# Patient Record
Sex: MALE | Race: WHITE | NOT HISPANIC OR LATINO | Employment: OTHER | URBAN - NONMETROPOLITAN AREA
[De-identification: names, ages, dates, MRNs, and addresses within clinical notes are randomized per-mention and may not be internally consistent; named-entity substitution may affect disease eponyms.]

---

## 2009-11-07 LAB — HCV AB SER-ACNC: NEGATIVE

## 2020-11-12 ENCOUNTER — HOSPITAL ENCOUNTER (EMERGENCY)
Facility: HOSPITAL | Age: 70
Discharge: HOME | End: 2020-11-12
Attending: EMERGENCY MEDICINE | Admitting: EMERGENCY MEDICINE
Payer: MEDICARE

## 2020-11-12 VITALS
BODY MASS INDEX: 46.43 KG/M2 | DIASTOLIC BLOOD PRESSURE: 80 MMHG | SYSTOLIC BLOOD PRESSURE: 132 MMHG | HEART RATE: 60 BPM | HEIGHT: 69 IN | TEMPERATURE: 97.3 F | OXYGEN SATURATION: 97 % | RESPIRATION RATE: 16 BRPM | WEIGHT: 313.49 LBS
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
## 2020-11-12 DIAGNOSIS — L03.313 CELLULITIS OF CHEST WALL: ICD-10-CM

## 2020-11-12 LAB
ALBUMIN SERPL BCP-MCNC: 3 G/DL (ref 3.5–5)
ALP SERPL-CCNC: 56 U/L (ref 46–116)
ALT SERPL W P-5'-P-CCNC: 26 U/L (ref 12–78)
ANION GAP SERPL CALCULATED.3IONS-SCNC: 5 MMOL/L (ref 4–13)
AST SERPL W P-5'-P-CCNC: 18 U/L (ref 5–45)
BASOPHILS # BLD AUTO: 0.08 THOUSANDS/ΜL (ref 0–0.1)
BASOPHILS NFR BLD AUTO: 1 % (ref 0–1)
BILIRUB SERPL-MCNC: 0.49 MG/DL (ref 0.2–1)
BUN SERPL-MCNC: 18 MG/DL (ref 5–25)
CALCIUM ALBUM COR SERPL-MCNC: 9.6 MG/DL (ref 8.3–10.1)
CALCIUM SERPL-MCNC: 8.8 MG/DL (ref 8.3–10.1)
CHLORIDE SERPL-SCNC: 102 MMOL/L (ref 100–108)
CO2 SERPL-SCNC: 31 MMOL/L (ref 21–32)
CREAT SERPL-MCNC: 1.17 MG/DL (ref 0.6–1.3)
EOSINOPHIL # BLD AUTO: 0.53 THOUSAND/ΜL (ref 0–0.61)
EOSINOPHIL NFR BLD AUTO: 6 % (ref 0–6)
ERYTHROCYTE [DISTWIDTH] IN BLOOD BY AUTOMATED COUNT: 13.7 % (ref 11.6–15.1)
GFR SERPL CREATININE-BSD FRML MDRD: 64 ML/MIN/1.73SQ M
GLUCOSE SERPL-MCNC: 125 MG/DL (ref 65–140)
HCT VFR BLD AUTO: 39.1 % (ref 36.5–49.3)
HGB BLD-MCNC: 12.8 G/DL (ref 12–17)
IMM GRANULOCYTES # BLD AUTO: 0.02 THOUSAND/UL (ref 0–0.2)
IMM GRANULOCYTES NFR BLD AUTO: 0 % (ref 0–2)
LYMPHOCYTES # BLD AUTO: 1.79 THOUSANDS/ΜL (ref 0.6–4.47)
LYMPHOCYTES NFR BLD AUTO: 22 % (ref 14–44)
MCH RBC QN AUTO: 29.1 PG (ref 26.8–34.3)
MCHC RBC AUTO-ENTMCNC: 32.7 G/DL (ref 31.4–37.4)
MCV RBC AUTO: 89 FL (ref 82–98)
MONOCYTES # BLD AUTO: 0.89 THOUSAND/ΜL (ref 0.17–1.22)
MONOCYTES NFR BLD AUTO: 11 % (ref 4–12)
NEUTROPHILS # BLD AUTO: 4.97 THOUSANDS/ΜL (ref 1.85–7.62)
NEUTS SEG NFR BLD AUTO: 60 % (ref 43–75)
NRBC BLD AUTO-RTO: 0 /100 WBCS
PLATELET # BLD AUTO: 350 THOUSANDS/UL (ref 149–390)
PMV BLD AUTO: 8.9 FL (ref 8.9–12.7)
POTASSIUM SERPL-SCNC: 3.8 MMOL/L (ref 3.5–5.3)
PROT SERPL-MCNC: 6.9 G/DL (ref 6.4–8.2)
RBC # BLD AUTO: 4.4 MILLION/UL (ref 3.88–5.62)
SODIUM SERPL-SCNC: 138 MMOL/L (ref 136–145)
WBC # BLD AUTO: 8.28 THOUSAND/UL (ref 4.31–10.16)

## 2020-11-12 PROCEDURE — 86618 LYME DISEASE ANTIBODY: CPT | Performed by: EMERGENCY MEDICINE

## 2020-11-12 PROCEDURE — 36415 COLL VENOUS BLD VENIPUNCTURE: CPT | Performed by: EMERGENCY MEDICINE

## 2020-11-12 PROCEDURE — 99283 EMERGENCY DEPT VISIT LOW MDM: CPT

## 2020-11-12 PROCEDURE — 80053 COMPREHEN METABOLIC PANEL: CPT | Performed by: EMERGENCY MEDICINE

## 2020-11-12 PROCEDURE — 85025 COMPLETE CBC W/AUTO DIFF WBC: CPT | Performed by: EMERGENCY MEDICINE

## 2020-11-12 PROCEDURE — 99284 EMERGENCY DEPT VISIT MOD MDM: CPT | Performed by: EMERGENCY MEDICINE

## 2020-11-12 RX ORDER — CHLORAL HYDRATE 500 MG
1 CAPSULE ORAL DAILY
COMMUNITY

## 2020-11-12 RX ORDER — METFORMIN HYDROCHLORIDE 500 MG/1
1500 TABLET, EXTENDED RELEASE ORAL DAILY
COMMUNITY
Start: 2020-07-15

## 2020-11-12 RX ORDER — HYDROCHLOROTHIAZIDE 50 MG/1
50 TABLET ORAL DAILY
COMMUNITY
Start: 2020-01-14 | End: 2021-01-13

## 2020-11-12 RX ORDER — CEPHALEXIN 250 MG/1
500 CAPSULE ORAL ONCE
Status: COMPLETED | OUTPATIENT
Start: 2020-11-12 | End: 2020-11-12

## 2020-11-12 RX ORDER — GLIMEPIRIDE 4 MG/1
4 TABLET ORAL DAILY
COMMUNITY
Start: 2020-07-15 | End: 2021-07-15

## 2020-11-12 RX ORDER — ENALAPRIL MALEATE 10 MG/1
10 TABLET ORAL DAILY
COMMUNITY
Start: 2020-01-14 | End: 2021-01-13

## 2020-11-12 RX ORDER — ASPIRIN 325 MG
325 TABLET ORAL DAILY
COMMUNITY

## 2020-11-12 RX ORDER — NITROGLYCERIN 0.4 MG/1
0.4 TABLET SUBLINGUAL AS NEEDED
COMMUNITY
Start: 2020-07-15

## 2020-11-12 RX ORDER — ATORVASTATIN CALCIUM 40 MG/1
40 TABLET, FILM COATED ORAL DAILY
COMMUNITY
Start: 2020-01-17 | End: 2021-01-16

## 2020-11-12 RX ORDER — GINSENG 100 MG
1 CAPSULE ORAL ONCE
Status: COMPLETED | OUTPATIENT
Start: 2020-11-12 | End: 2020-11-12

## 2020-11-12 RX ORDER — CEPHALEXIN 500 MG/1
500 CAPSULE ORAL EVERY 6 HOURS SCHEDULED
Qty: 20 CAPSULE | Refills: 0 | Status: SHIPPED | OUTPATIENT
Start: 2020-11-12 | End: 2020-11-17

## 2020-11-12 RX ADMIN — BACITRACIN ZINC 1 SMALL APPLICATION: 500 OINTMENT TOPICAL at 11:34

## 2020-11-12 RX ADMIN — CEPHALEXIN 500 MG: 250 CAPSULE ORAL at 11:34

## 2020-11-14 LAB — B BURGDOR IGG+IGM SER-ACNC: 35

## 2022-02-23 ENCOUNTER — HOSPITAL ENCOUNTER (EMERGENCY)
Facility: HOSPITAL | Age: 72
Discharge: HOME | End: 2022-02-23
Attending: EMERGENCY MEDICINE | Admitting: EMERGENCY MEDICINE
Payer: MEDICARE

## 2022-02-23 ENCOUNTER — APPOINTMENT (EMERGENCY)
Dept: RADIOLOGY | Facility: HOSPITAL | Age: 72
End: 2022-02-23
Payer: MEDICARE

## 2022-02-23 ENCOUNTER — APPOINTMENT (EMERGENCY)
Dept: CT IMAGING | Facility: HOSPITAL | Age: 72
End: 2022-02-23
Payer: MEDICARE

## 2022-02-23 VITALS
WEIGHT: 315 LBS | OXYGEN SATURATION: 95 % | BODY MASS INDEX: 48.74 KG/M2 | SYSTOLIC BLOOD PRESSURE: 121 MMHG | HEART RATE: 82 BPM | RESPIRATION RATE: 19 BRPM | TEMPERATURE: 97 F | DIASTOLIC BLOOD PRESSURE: 72 MMHG

## 2022-02-23 DIAGNOSIS — S60.011A CONTUSION OF RIGHT THUMB: ICD-10-CM

## 2022-02-23 DIAGNOSIS — S80.02XA CONTUSION OF LEFT KNEE, INITIAL ENCOUNTER: ICD-10-CM

## 2022-02-23 DIAGNOSIS — S16.1XXA STRAIN OF NECK MUSCLE, INITIAL ENCOUNTER: ICD-10-CM

## 2022-02-23 DIAGNOSIS — S00.83XA CONTUSION OF FACE, INITIAL ENCOUNTER: ICD-10-CM

## 2022-02-23 DIAGNOSIS — T07.XXXA ABRASIONS OF MULTIPLE SITES: ICD-10-CM
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
## 2022-02-23 PROCEDURE — 99284 EMERGENCY DEPT VISIT MOD MDM: CPT

## 2022-02-23 PROCEDURE — 99284 EMERGENCY DEPT VISIT MOD MDM: CPT | Performed by: EMERGENCY MEDICINE

## 2022-02-23 PROCEDURE — 73564 X-RAY EXAM KNEE 4 OR MORE: CPT

## 2022-02-23 PROCEDURE — 90471 IMMUNIZATION ADMIN: CPT

## 2022-02-23 PROCEDURE — 90715 TDAP VACCINE 7 YRS/> IM: CPT | Performed by: EMERGENCY MEDICINE

## 2022-02-23 PROCEDURE — 72125 CT NECK SPINE W/O DYE: CPT

## 2022-02-23 PROCEDURE — 70450 CT HEAD/BRAIN W/O DYE: CPT

## 2022-02-23 PROCEDURE — 73140 X-RAY EXAM OF FINGER(S): CPT

## 2022-02-23 RX ADMIN — TETANUS TOXOID, REDUCED DIPHTHERIA TOXOID AND ACELLULAR PERTUSSIS VACCINE, ADSORBED 0.5 ML: 5; 2.5; 8; 8; 2.5 SUSPENSION INTRAMUSCULAR at 18:27
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
## 2022-03-20 ENCOUNTER — HOSPITAL ENCOUNTER (EMERGENCY)
Facility: HOSPITAL | Age: 72
Discharge: HOME | End: 2022-03-20
Attending: EMERGENCY MEDICINE | Admitting: EMERGENCY MEDICINE
Payer: MEDICARE

## 2022-03-20 VITALS
SYSTOLIC BLOOD PRESSURE: 144 MMHG | HEART RATE: 87 BPM | WEIGHT: 315 LBS | TEMPERATURE: 97.9 F | OXYGEN SATURATION: 96 % | DIASTOLIC BLOOD PRESSURE: 106 MMHG | BODY MASS INDEX: 46.65 KG/M2 | HEIGHT: 69 IN | RESPIRATION RATE: 24 BRPM

## 2022-03-20 DIAGNOSIS — L03.116 CELLULITIS OF LEFT KNEE: Primary | ICD-10-CM

## 2022-03-20 LAB
ALBUMIN SERPL BCP-MCNC: 3.2 G/DL (ref 3.5–5)
ALP SERPL-CCNC: 65 U/L (ref 46–116)
ALT SERPL W P-5'-P-CCNC: 30 U/L (ref 12–78)
ANION GAP SERPL CALCULATED.3IONS-SCNC: 6 MMOL/L (ref 4–13)
AST SERPL W P-5'-P-CCNC: 18 U/L (ref 5–45)
BASOPHILS # BLD AUTO: 0.08 THOUSANDS/ΜL (ref 0–0.1)
BASOPHILS NFR BLD AUTO: 1 % (ref 0–1)
BILIRUB SERPL-MCNC: 0.49 MG/DL (ref 0.2–1)
BUN SERPL-MCNC: 15 MG/DL (ref 5–25)
CALCIUM ALBUM COR SERPL-MCNC: 9.9 MG/DL (ref 8.3–10.1)
CALCIUM SERPL-MCNC: 9.3 MG/DL (ref 8.3–10.1)
CHLORIDE SERPL-SCNC: 95 MMOL/L (ref 100–108)
CO2 SERPL-SCNC: 34 MMOL/L (ref 21–32)
CREAT SERPL-MCNC: 0.91 MG/DL (ref 0.6–1.3)
EOSINOPHIL # BLD AUTO: 0.2 THOUSAND/ΜL (ref 0–0.61)
EOSINOPHIL NFR BLD AUTO: 2 % (ref 0–6)
ERYTHROCYTE [DISTWIDTH] IN BLOOD BY AUTOMATED COUNT: 14 % (ref 11.6–15.1)
GFR SERPL CREATININE-BSD FRML MDRD: 85 ML/MIN/1.73SQ M
GLUCOSE SERPL-MCNC: 136 MG/DL (ref 65–140)
HCT VFR BLD AUTO: 40 % (ref 36.5–49.3)
HGB BLD-MCNC: 13.2 G/DL (ref 12–17)
IMM GRANULOCYTES # BLD AUTO: 0.05 THOUSAND/UL (ref 0–0.2)
IMM GRANULOCYTES NFR BLD AUTO: 0 % (ref 0–2)
LACTATE SERPL-SCNC: 1 MMOL/L (ref 0.5–2)
LYMPHOCYTES # BLD AUTO: 1.95 THOUSANDS/ΜL (ref 0.6–4.47)
LYMPHOCYTES NFR BLD AUTO: 16 % (ref 14–44)
MCH RBC QN AUTO: 29.2 PG (ref 26.8–34.3)
MCHC RBC AUTO-ENTMCNC: 33 G/DL (ref 31.4–37.4)
MCV RBC AUTO: 89 FL (ref 82–98)
MONOCYTES # BLD AUTO: 1.23 THOUSAND/ΜL (ref 0.17–1.22)
MONOCYTES NFR BLD AUTO: 10 % (ref 4–12)
NEUTROPHILS # BLD AUTO: 8.55 THOUSANDS/ΜL (ref 1.85–7.62)
NEUTS SEG NFR BLD AUTO: 71 % (ref 43–75)
NRBC BLD AUTO-RTO: 0 /100 WBCS
PLATELET # BLD AUTO: 372 THOUSANDS/UL (ref 149–390)
PMV BLD AUTO: 8.7 FL (ref 8.9–12.7)
POTASSIUM SERPL-SCNC: 3.7 MMOL/L (ref 3.5–5.3)
PROT SERPL-MCNC: 8.2 G/DL (ref 6.4–8.2)
RBC # BLD AUTO: 4.52 MILLION/UL (ref 3.88–5.62)
SODIUM SERPL-SCNC: 135 MMOL/L (ref 136–145)
WBC # BLD AUTO: 12.06 THOUSAND/UL (ref 4.31–10.16)

## 2022-03-20 PROCEDURE — 99283 EMERGENCY DEPT VISIT LOW MDM: CPT

## 2022-03-20 PROCEDURE — 99284 EMERGENCY DEPT VISIT MOD MDM: CPT | Performed by: EMERGENCY MEDICINE

## 2022-03-20 PROCEDURE — 96367 TX/PROPH/DG ADDL SEQ IV INF: CPT

## 2022-03-20 PROCEDURE — 83605 ASSAY OF LACTIC ACID: CPT | Performed by: EMERGENCY MEDICINE

## 2022-03-20 PROCEDURE — 96365 THER/PROPH/DIAG IV INF INIT: CPT

## 2022-03-20 PROCEDURE — 96366 THER/PROPH/DIAG IV INF ADDON: CPT

## 2022-03-20 PROCEDURE — 85025 COMPLETE CBC W/AUTO DIFF WBC: CPT | Performed by: EMERGENCY MEDICINE

## 2022-03-20 PROCEDURE — 80053 COMPREHEN METABOLIC PANEL: CPT | Performed by: EMERGENCY MEDICINE

## 2022-03-20 PROCEDURE — 87040 BLOOD CULTURE FOR BACTERIA: CPT | Performed by: EMERGENCY MEDICINE

## 2022-03-20 PROCEDURE — 36415 COLL VENOUS BLD VENIPUNCTURE: CPT | Performed by: EMERGENCY MEDICINE

## 2022-03-20 RX ORDER — AMOXICILLIN AND CLAVULANATE POTASSIUM 875; 125 MG/1; MG/1
1 TABLET, FILM COATED ORAL EVERY 12 HOURS
Qty: 20 TABLET | Refills: 0 | Status: SHIPPED | OUTPATIENT
Start: 2022-03-20 | End: 2022-03-30

## 2022-03-20 RX ORDER — CEFTRIAXONE 1 G/50ML
1000 INJECTION, SOLUTION INTRAVENOUS ONCE
Status: COMPLETED | OUTPATIENT
Start: 2022-03-20 | End: 2022-03-20

## 2022-03-20 RX ADMIN — Medication 1500 MG: at 20:07

## 2022-03-20 RX ADMIN — CEFTRIAXONE 1000 MG: 1 INJECTION, SOLUTION INTRAVENOUS at 19:48
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
## 2022-03-20 NOTE — DISCHARGE INSTRUCTIONS
Warm compresses to the area  Please return if symptoms worsen  Please return if he developed any fevers or chills

## 2022-03-26 LAB
BACTERIA BLD CULT: NORMAL
BACTERIA BLD CULT: NORMAL

## 2023-03-30 ENCOUNTER — APPOINTMENT (EMERGENCY)
Dept: RADIOLOGY | Facility: HOSPITAL | Age: 73
End: 2023-03-30

## 2023-03-30 ENCOUNTER — HOSPITAL ENCOUNTER (INPATIENT)
Facility: HOSPITAL | Age: 73
LOS: 3 days | Discharge: HOME HEALTH | End: 2023-04-02
Attending: EMERGENCY MEDICINE | Admitting: STUDENT IN AN ORGANIZED HEALTH CARE EDUCATION/TRAINING PROGRAM

## 2023-03-30 DIAGNOSIS — E66.01 CLASS 3 SEVERE OBESITY DUE TO EXCESS CALORIES WITH SERIOUS COMORBIDITY AND BODY MASS INDEX (BMI) OF 45.0 TO 49.9 IN ADULT (HCC): ICD-10-CM

## 2023-03-30 DIAGNOSIS — I50.33 ACUTE ON CHRONIC DIASTOLIC (CONGESTIVE) HEART FAILURE (HCC): ICD-10-CM

## 2023-03-30 DIAGNOSIS — I50.9 ACUTE CONGESTIVE HEART FAILURE, UNSPECIFIED HEART FAILURE TYPE (HCC): Primary | ICD-10-CM

## 2023-03-30 PROBLEM — I48.11 LONGSTANDING PERSISTENT ATRIAL FIBRILLATION (HCC): Status: ACTIVE | Noted: 2023-03-30

## 2023-03-30 PROBLEM — E66.813 CLASS 3 SEVERE OBESITY DUE TO EXCESS CALORIES WITH SERIOUS COMORBIDITY AND BODY MASS INDEX (BMI) OF 45.0 TO 49.9 IN ADULT (HCC): Status: ACTIVE | Noted: 2023-03-30

## 2023-03-30 PROBLEM — E11.65 TYPE 2 DIABETES MELLITUS WITH HYPERGLYCEMIA, WITHOUT LONG-TERM CURRENT USE OF INSULIN (HCC): Status: ACTIVE | Noted: 2023-03-30

## 2023-03-30 LAB
2HR DELTA HS TROPONIN: 0 NG/L
ALBUMIN SERPL BCP-MCNC: 3.9 G/DL (ref 3.5–5)
ALP SERPL-CCNC: 58 U/L (ref 34–104)
ALT SERPL W P-5'-P-CCNC: 20 U/L (ref 7–52)
ANION GAP SERPL CALCULATED.3IONS-SCNC: 6 MMOL/L (ref 4–13)
APTT PPP: 42 SECONDS (ref 23–37)
AST SERPL W P-5'-P-CCNC: 21 U/L (ref 13–39)
BASE EX.OXY STD BLDV CALC-SCNC: 54.1 % (ref 60–80)
BASE EXCESS BLDV CALC-SCNC: 5.6 MMOL/L
BASOPHILS # BLD AUTO: 0.12 THOUSANDS/ÂΜL (ref 0–0.1)
BASOPHILS NFR BLD AUTO: 1 % (ref 0–1)
BETA-HYDROXYBUTYRATE: 0.2 MMOL/L
BILIRUB SERPL-MCNC: 0.55 MG/DL (ref 0.2–1)
BNP SERPL-MCNC: 210 PG/ML (ref 0–100)
BUN SERPL-MCNC: 16 MG/DL (ref 5–25)
CALCIUM SERPL-MCNC: 9.6 MG/DL (ref 8.4–10.2)
CARDIAC TROPONIN I PNL SERPL HS: 7 NG/L
CARDIAC TROPONIN I PNL SERPL HS: 7 NG/L
CHLORIDE SERPL-SCNC: 101 MMOL/L (ref 96–108)
CO2 SERPL-SCNC: 32 MMOL/L (ref 21–32)
CREAT SERPL-MCNC: 0.91 MG/DL (ref 0.6–1.3)
EOSINOPHIL # BLD AUTO: 1.36 THOUSAND/ÂΜL (ref 0–0.61)
EOSINOPHIL NFR BLD AUTO: 14 % (ref 0–6)
ERYTHROCYTE [DISTWIDTH] IN BLOOD BY AUTOMATED COUNT: 13.9 % (ref 11.6–15.1)
GFR SERPL CREATININE-BSD FRML MDRD: 84 ML/MIN/1.73SQ M
GLUCOSE SERPL-MCNC: 141 MG/DL (ref 65–140)
GLUCOSE SERPL-MCNC: 145 MG/DL (ref 65–140)
HCO3 BLDV-SCNC: 32.2 MMOL/L (ref 24–30)
HCT VFR BLD AUTO: 41.6 % (ref 36.5–49.3)
HGB BLD-MCNC: 12.9 G/DL (ref 12–17)
IMM GRANULOCYTES # BLD AUTO: 0.03 THOUSAND/UL (ref 0–0.2)
IMM GRANULOCYTES NFR BLD AUTO: 0 % (ref 0–2)
INR PPP: 1.94 (ref 0.84–1.19)
LYMPHOCYTES # BLD AUTO: 2.18 THOUSANDS/ÂΜL (ref 0.6–4.47)
LYMPHOCYTES NFR BLD AUTO: 22 % (ref 14–44)
MCH RBC QN AUTO: 28.2 PG (ref 26.8–34.3)
MCHC RBC AUTO-ENTMCNC: 31 G/DL (ref 31.4–37.4)
MCV RBC AUTO: 91 FL (ref 82–98)
MONOCYTES # BLD AUTO: 0.98 THOUSAND/ÂΜL (ref 0.17–1.22)
MONOCYTES NFR BLD AUTO: 10 % (ref 4–12)
NEUTROPHILS # BLD AUTO: 5.4 THOUSANDS/ÂΜL (ref 1.85–7.62)
NEUTS SEG NFR BLD AUTO: 53 % (ref 43–75)
NRBC BLD AUTO-RTO: 0 /100 WBCS
O2 CT BLDV-SCNC: 10.5 ML/DL
PCO2 BLDV: 55.3 MM HG (ref 42–50)
PH BLDV: 7.38 [PH] (ref 7.3–7.4)
PLATELET # BLD AUTO: 264 THOUSANDS/UL (ref 149–390)
PMV BLD AUTO: 9.1 FL (ref 8.9–12.7)
PO2 BLDV: 28.5 MM HG (ref 35–45)
POTASSIUM SERPL-SCNC: 4.5 MMOL/L (ref 3.5–5.3)
PROT SERPL-MCNC: 7.2 G/DL (ref 6.4–8.4)
PROTHROMBIN TIME: 22.3 SECONDS (ref 11.6–14.5)
RBC # BLD AUTO: 4.57 MILLION/UL (ref 3.88–5.62)
SODIUM SERPL-SCNC: 139 MMOL/L (ref 135–147)
WBC # BLD AUTO: 10.07 THOUSAND/UL (ref 4.31–10.16)

## 2023-03-30 RX ORDER — NITROGLYCERIN 0.4 MG/1
0.4 TABLET SUBLINGUAL ONCE
Status: COMPLETED | OUTPATIENT
Start: 2023-03-30 | End: 2023-03-30

## 2023-03-30 RX ORDER — FUROSEMIDE 10 MG/ML
40 INJECTION INTRAMUSCULAR; INTRAVENOUS
Status: DISCONTINUED | OUTPATIENT
Start: 2023-03-31 | End: 2023-03-31

## 2023-03-30 RX ORDER — GLYBURIDE 5 MG/1
5 TABLET ORAL DAILY
COMMUNITY

## 2023-03-30 RX ORDER — INSULIN LISPRO 100 [IU]/ML
2-12 INJECTION, SOLUTION INTRAVENOUS; SUBCUTANEOUS
Status: DISCONTINUED | OUTPATIENT
Start: 2023-03-30 | End: 2023-04-02 | Stop reason: HOSPADM

## 2023-03-30 RX ORDER — INSULIN LISPRO 100 [IU]/ML
2-12 INJECTION, SOLUTION INTRAVENOUS; SUBCUTANEOUS
Status: DISCONTINUED | OUTPATIENT
Start: 2023-03-31 | End: 2023-04-02 | Stop reason: HOSPADM

## 2023-03-30 RX ORDER — ATORVASTATIN CALCIUM 40 MG/1
40 TABLET, FILM COATED ORAL
Status: DISCONTINUED | OUTPATIENT
Start: 2023-03-31 | End: 2023-03-30

## 2023-03-30 RX ORDER — PIOGLITAZONEHYDROCHLORIDE 45 MG/1
45 TABLET ORAL DAILY
COMMUNITY

## 2023-03-30 RX ORDER — LANOLIN ALCOHOL/MO/W.PET/CERES
3 CREAM (GRAM) TOPICAL
Status: DISCONTINUED | OUTPATIENT
Start: 2023-03-30 | End: 2023-04-02 | Stop reason: HOSPADM

## 2023-03-30 RX ORDER — FUROSEMIDE 10 MG/ML
40 INJECTION INTRAMUSCULAR; INTRAVENOUS ONCE
Status: COMPLETED | OUTPATIENT
Start: 2023-03-30 | End: 2023-03-30

## 2023-03-30 RX ORDER — ENOXAPARIN SODIUM 100 MG/ML
40 INJECTION SUBCUTANEOUS DAILY
Status: DISCONTINUED | OUTPATIENT
Start: 2023-03-31 | End: 2023-03-30

## 2023-03-30 RX ORDER — ATORVASTATIN CALCIUM 40 MG/1
80 TABLET, FILM COATED ORAL
Status: DISCONTINUED | OUTPATIENT
Start: 2023-03-31 | End: 2023-04-02 | Stop reason: HOSPADM

## 2023-03-30 RX ORDER — METOPROLOL SUCCINATE 50 MG/1
50 TABLET, EXTENDED RELEASE ORAL DAILY
Status: DISCONTINUED | OUTPATIENT
Start: 2023-03-31 | End: 2023-03-31

## 2023-03-30 RX ORDER — ACETAMINOPHEN 325 MG/1
650 TABLET ORAL EVERY 6 HOURS PRN
Status: DISCONTINUED | OUTPATIENT
Start: 2023-03-30 | End: 2023-04-02 | Stop reason: HOSPADM

## 2023-03-30 RX ORDER — METOPROLOL SUCCINATE 50 MG/1
50 TABLET, EXTENDED RELEASE ORAL DAILY
COMMUNITY

## 2023-03-30 RX ORDER — ROSUVASTATIN CALCIUM 40 MG/1
40 TABLET, COATED ORAL DAILY
COMMUNITY

## 2023-03-30 RX ORDER — METOPROLOL TARTRATE AND HYDROCHLOROTHIAZIDE 50; 25 MG/1; MG/1
1 TABLET ORAL DAILY
Status: ON HOLD | COMMUNITY
End: 2023-03-30

## 2023-03-30 RX ADMIN — NITROGLYCERIN 0.4 MG: 0.4 TABLET SUBLINGUAL at 19:24

## 2023-03-30 RX ADMIN — FUROSEMIDE 40 MG: 10 INJECTION, SOLUTION INTRAVENOUS at 19:35

## 2023-03-30 NOTE — ED PROVIDER NOTES
History  Chief Complaint   Patient presents with   • Shortness of Breath     Since Monday feeling SOB and bilateral lower leg swelling, denies fever/cough, no hx of CHF     79-year-old male accompanied by daughter describes worsening dyspnea, exertional and orthopnea over the past 2 weeks with associated lower extremity swelling, recently sleeping in a chair  Notes he ran out of hydrochlorothiazide few weeks ago, maybe longer  No hemoptysis  No chest pain  No URI symptoms  Mentions he feels a swishing in his chest when he is walking, noticed couple years ago walking dog  Past medical history includes CAD and atrial fibrillation and medications include oral anticoagulation, compliant today      History provided by:  Patient  Shortness of Breath  Onset quality:  Gradual  Timing:  Constant  Progression:  Worsening  Chronicity:  New  Context: activity    Context: not URI    Relieved by:  Sitting up  Worsened by: Activity  Ineffective treatments:  None tried  Associated symptoms: no abdominal pain, no chest pain, no fever and no sputum production    Risk factors: obesity        Prior to Admission Medications   Prescriptions Last Dose Informant Patient Reported? Taking?    Omega-3 Fatty Acids (fish oil) 1,000 mg   Yes No   Sig: Take 1 capsule by mouth daily   aspirin 325 mg tablet   Yes No   Sig: Take 325 mg by mouth daily   atorvastatin (LIPITOR) 40 mg tablet   Yes No   Sig: Take 40 mg by mouth daily   enalapril (VASOTEC) 10 mg tablet   Yes No   Sig: Take 10 mg by mouth daily   glimepiride (AMARYL) 4 mg tablet   Yes No   Sig: Take 4 mg by mouth daily   hydrochlorothiazide (HYDRODIURIL) 50 mg tablet   Yes No   Sig: Take 50 mg by mouth daily   metFORMIN (GLUCOPHAGE-XR) 500 mg 24 hr tablet   Yes No   Sig: Take 1,500 mg by mouth daily   nitroglycerin (NITROSTAT) 0 4 mg SL tablet   Yes No   Sig: Place 0 4 mg under the tongue as needed      Facility-Administered Medications: None       Past Medical History:   Diagnosis Date   • Diabetes mellitus ([facility] )    • Hypertension    • MI (myocardial infarction) ([facility] )        Past Surgical History:   Procedure Laterality Date   • CORONARY ANGIOPLASTY WITH STENT PLACEMENT     • ROTATOR CUFF REPAIR         History reviewed  No pertinent family history  I have reviewed and agree with the history as documented  E-Cigarette/Vaping   • E-Cigarette Use Never User      E-Cigarette/Vaping Substances     Social History     Tobacco Use   • Smoking status: Never   • Smokeless tobacco: Former     Types: Chew   Vaping Use   • Vaping Use: Never used   Substance Use Topics   • Alcohol use: Not Currently   • Drug use: Never       Review of Systems   Constitutional: Negative for fever  Respiratory: Positive for shortness of breath  Negative for sputum production  Cardiovascular: Negative for chest pain  Gastrointestinal: Negative for abdominal pain  All other systems reviewed and are negative  Physical Exam  Physical Exam  Vitals and nursing note reviewed  Constitutional:       General: He is not in acute distress  Comments: Pleasant, comfortable-appearing   HENT:      Head: Normocephalic and atraumatic  Mouth/Throat:      Mouth: Mucous membranes are moist       Pharynx: Oropharynx is clear  Eyes:      Conjunctiva/sclera: Conjunctivae normal       Pupils: Pupils are equal, round, and reactive to light  Cardiovascular:      Rate and Rhythm: Normal rate and regular rhythm  Heart sounds: Normal heart sounds  Pulmonary:      Effort: Pulmonary effort is normal       Breath sounds: Examination of the right-lower field reveals rales  Examination of the left-lower field reveals rales  Rales present  Abdominal:      General: Bowel sounds are normal  There is no distension  Palpations: Abdomen is soft  Tenderness: There is no abdominal tenderness  Musculoskeletal:         General: No deformity  Cervical back: Neck supple  Right lower leg: Edema present  Left lower leg: Edema present  Skin:     General: Skin is warm and dry  Neurological:      General: No focal deficit present  Mental Status: He is alert and oriented to person, place, and time  Cranial Nerves: No cranial nerve deficit  Coordination: Coordination normal    Psychiatric:         Behavior: Behavior normal          Thought Content:  Thought content normal          Judgment: Judgment normal          Vital Signs  ED Triage Vitals [03/30/23 1854]   Temperature Pulse Respirations Blood Pressure SpO2   (!) 97 1 °F (36 2 °C) 81 20 (!) 179/119 96 %      Temp src Heart Rate Source Patient Position - Orthostatic VS BP Location FiO2 (%)   -- Monitor -- -- --      Pain Score       --           Vitals:    03/30/23 1854   BP: (!) 179/119   Pulse: 81         Visual Acuity      ED Medications  Medications   nitroglycerin (NITROSTAT) SL tablet 0 4 mg (0 4 mg Sublingual Given 3/30/23 1924)   furosemide (LASIX) injection 40 mg (40 mg Intravenous Given 3/30/23 1935)       Diagnostic Studies  Results Reviewed     Procedure Component Value Units Date/Time    HS Troponin I 4hr [[id number]]     Lab Status: No result Specimen: Blood     HS Troponin 0hr (reflex protocol) [[id number]]  (Normal) Collected: 03/30/23 1924    Lab Status: Final result Specimen: Blood from Arm, Right Updated: 03/30/23 1957     hs TnI 0hr 7 ng/L     HS Troponin I 2hr [[id number]]     Lab Status: No result Specimen: Blood     B-Type Natriuretic Peptide(BNP) [[id number]]  (Abnormal) Collected: 03/30/23 1924    Lab Status: Final result Specimen: Blood from Arm, Right Updated: 03/30/23 1956      pg/mL     Protime-INR [[id number]]  (Abnormal) Collected: 03/30/23 1924    Lab Status: Final result Specimen: Blood from Arm, Right Updated: 03/30/23 1951     Protime 22 3 seconds      INR 1 94    APTT [[id number]]  (Abnormal) Collected: 03/30/23 1924    Lab Status: Final result Specimen: Blood from Arm, Right Updated: 03/30/23 1951     PTT 42 seconds     Comprehensive metabolic panel [[id number]]  (Abnormal) Collected: 03/30/23 1924    Lab Status: Final result Specimen: Blood from Arm, Right Updated: 03/30/23 1950     Sodium 139 mmol/L      Potassium 4 5 mmol/L      Chloride 101 mmol/L      CO2 32 mmol/L      ANION GAP 6 mmol/L      BUN 16 mg/dL      Creatinine 0 91 mg/dL      Glucose 145 mg/dL      Calcium 9 6 mg/dL      AST 21 U/L      ALT 20 U/L      Alkaline Phosphatase 58 U/L      Total Protein 7 2 g/dL      Albumin 3 9 g/dL      Total Bilirubin 0 55 mg/dL      eGFR 84 ml/min/1 73sq m     Narrative:      [place] guidelines for Chronic Kidney Disease (CKD):   •  Stage 1 with normal or high GFR (GFR > 90 mL/min/1 73 square meters)  •  Stage 2 Mild CKD (GFR = 60-89 mL/min/1 73 square meters)  •  Stage 3A Moderate CKD (GFR = 45-59 mL/min/1 73 square meters)  •  Stage 3B Moderate CKD (GFR = 30-44 mL/min/1 73 square meters)  •  Stage 4 Severe CKD (GFR = 15-29 mL/min/1 73 square meters)  •  Stage 5 End Stage CKD (GFR <15 mL/min/1 73 square meters)  Note: GFR calculation is accurate only with a steady state creatinine    Beta Hydroxybutyrate [[id number]]  (Normal) Collected: 03/30/23 1924    Lab Status: Final result Specimen: Blood from Arm, Right Updated: 03/30/23 1936     BETA-HYDROXYBUTYRATE 0 2 mmol/L     Blood gas, venous [[id number]]  (Abnormal) Collected: 03/30/23 1924    Lab Status: Final result Specimen: Blood from Arm, Right Updated: 03/30/23 1933     pH, Danie 7 383     pCO2, Danie 55 3 mm Hg      pO2, Danie 28 5 mm Hg      HCO3, Danie 32 2 mmol/L      Base Excess, Danie 5 6 mmol/L      O2 Content, Danie 10 5 ml/dL      O2 HGB, VENOUS 54 1 %     CBC and differential [[id number]]  (Abnormal) Collected: 03/30/23 1924    Lab Status: Final result Specimen: Blood from Arm, Right Updated: 03/30/23 1931     WBC 10 07 Thousand/uL      RBC 4 57 Million/uL      Hemoglobin 12 9 g/dL      Hematocrit 41 6 %      MCV 91 fL      MCH 28 2 pg      MCHC 31 0 g/dL      RDW 13 9 %      MPV 9 1 fL      Platelets 063 Thousands/uL      nRBC 0 /100 WBCs      Neutrophils Relative 53 %      Immat GRANS % 0 %      Lymphocytes Relative 22 %      Monocytes Relative 10 %      Eosinophils Relative 14 %      Basophils Relative 1 %      Neutrophils Absolute 5 40 Thousands/µL      Immature Grans Absolute 0 03 Thousand/uL      Lymphocytes Absolute 2 18 Thousands/µL      Monocytes Absolute 0 98 Thousand/µL      Eosinophils Absolute 1 36 Thousand/µL      Basophils Absolute 0 12 Thousands/µL                  XR chest portable   ED Interpretation by Park Lara DO (03/30 1951)   Congestive heart failure                 Procedures  ECG 12 Lead Documentation Only    Date/Time: 3/30/2023 7:21 PM  Performed by: Park Lara DO  Authorized by: Park Lara DO     Indications / Diagnosis:  Dyspnea  ECG reviewed by me, the ED Provider: yes    Patient location:  ED  Interpretation:     Interpretation: normal    Rate:     ECG rate:  79    ECG rate assessment: normal    Rhythm:     Rhythm: atrial fibrillation    Ectopy:     Ectopy: none    QRS:     QRS axis:  Normal  Conduction:     Conduction: normal    ST segments:     ST segments:  Normal  T waves:     T waves: inverted      Inverted:  V3             ED Course  ED Course as of 03/30/23 2103   Thu Mar 30, 2023   1909 HCTZ 50 mg daily ran-out few weeks ago   1951 POCT INR(!): 1 94   1951 BETA-HYDROXYBUTYRATE: 0 2   1951 pH, Danie: 7 383   1951 WBC: 10 07   2003 BNP(!): 210   2003 hs TnI 0hr: 7   2101 Room air oxygen saturation 92-93 decreases to 90 on ambulation  Mildly tachypneic  Good urine output with Lasix  We discussed outpatient care versus hospitalization and agreeable with inpatient    Hospitalist agreeable with admission                                             Medical Decision Making  Acute congestive heart failure, unspecified heart failure type [facility]): acute illness or injury  Amount and/or Complexity of Data Reviewed  Independent Historian:      Details: Daughter  External Data Reviewed: notes  Labs: ordered  Decision-making details documented in ED Course  Radiology: ordered and independent interpretation performed  Decision-making details documented in ED Course  ECG/medicine tests: ordered and independent interpretation performed  Decision-making details documented in ED Course  Risk  Prescription drug management  Decision regarding hospitalization  Disposition  Final diagnoses:   Acute congestive heart failure, unspecified heart failure type [facility])     Time reflects when diagnosis was documented in both MDM as applicable and the Disposition within this note     Time User Action Codes Description Comment    3/30/2023  8:47 PM Irasema Carrera Add [I50 9] Acute congestive heart failure, unspecified heart failure type [facility])       ED Disposition     ED Disposition   Admit    Condition   Stable    Date/Time   u Mar 30, 2023  8:47 PM    Comment   Case was discussed with Kirk and the patient's admission status was agreed to be Admission Status: inpatient status to the service of Dr Kj Rock    None         Patient's Medications   Discharge Prescriptions    No medications on file       No discharge procedures on file      PDMP Review       Value Time User    PDMP Reviewed  Yes 11/12/2020 11:33 AM Andrea Pollock MD          ED Provider  Electronically Signed by           Rio Hunter DO  03/30/23 5053

## 2023-03-31 PROBLEM — I25.10 CORONARY ARTERY DISEASE INVOLVING NATIVE CORONARY ARTERY OF NATIVE HEART: Status: ACTIVE | Noted: 2023-03-31

## 2023-03-31 LAB
4HR DELTA HS TROPONIN: 0 NG/L
ANION GAP SERPL CALCULATED.3IONS-SCNC: 5 MMOL/L (ref 4–13)
BUN SERPL-MCNC: 15 MG/DL (ref 5–25)
CALCIUM SERPL-MCNC: 9.4 MG/DL (ref 8.4–10.2)
CARDIAC TROPONIN I PNL SERPL HS: 7 NG/L
CHLORIDE SERPL-SCNC: 100 MMOL/L (ref 96–108)
CHOLEST SERPL-MCNC: 83 MG/DL
CO2 SERPL-SCNC: 35 MMOL/L (ref 21–32)
CREAT SERPL-MCNC: 0.88 MG/DL (ref 0.6–1.3)
ERYTHROCYTE [DISTWIDTH] IN BLOOD BY AUTOMATED COUNT: 13.9 % (ref 11.6–15.1)
GFR SERPL CREATININE-BSD FRML MDRD: 86 ML/MIN/1.73SQ M
GLUCOSE SERPL-MCNC: 122 MG/DL (ref 65–140)
GLUCOSE SERPL-MCNC: 130 MG/DL (ref 65–140)
GLUCOSE SERPL-MCNC: 146 MG/DL (ref 65–140)
GLUCOSE SERPL-MCNC: 157 MG/DL (ref 65–140)
GLUCOSE SERPL-MCNC: 159 MG/DL (ref 65–140)
HCT VFR BLD AUTO: 38.6 % (ref 36.5–49.3)
HDLC SERPL-MCNC: 34 MG/DL
HGB BLD-MCNC: 12.2 G/DL (ref 12–17)
LDLC SERPL CALC-MCNC: 39 MG/DL (ref 0–100)
MAGNESIUM SERPL-MCNC: 1.7 MG/DL (ref 1.9–2.7)
MCH RBC QN AUTO: 28.5 PG (ref 26.8–34.3)
MCHC RBC AUTO-ENTMCNC: 31.6 G/DL (ref 31.4–37.4)
MCV RBC AUTO: 90 FL (ref 82–98)
PLATELET # BLD AUTO: 233 THOUSANDS/UL (ref 149–390)
PMV BLD AUTO: 8.8 FL (ref 8.9–12.7)
POTASSIUM SERPL-SCNC: 3.9 MMOL/L (ref 3.5–5.3)
QRS AXIS: -7 DEGREES
QRSD INTERVAL: 106 MS
QT INTERVAL: 376 MS
QTC INTERVAL: 431 MS
RBC # BLD AUTO: 4.28 MILLION/UL (ref 3.88–5.62)
SODIUM SERPL-SCNC: 140 MMOL/L (ref 135–147)
T WAVE AXIS: -19 DEGREES
TRIGL SERPL-MCNC: 50 MG/DL
TSH SERPL DL<=0.05 MIU/L-ACNC: 1.89 UIU/ML (ref 0.45–4.5)
VENTRICULAR RATE: 79 BPM
WBC # BLD AUTO: 9.69 THOUSAND/UL (ref 4.31–10.16)

## 2023-03-31 RX ORDER — METOPROLOL SUCCINATE 25 MG/1
25 TABLET, EXTENDED RELEASE ORAL ONCE
Status: COMPLETED | OUTPATIENT
Start: 2023-03-31 | End: 2023-03-31

## 2023-03-31 RX ORDER — MAGNESIUM SULFATE HEPTAHYDRATE 40 MG/ML
2 INJECTION, SOLUTION INTRAVENOUS ONCE
Status: COMPLETED | OUTPATIENT
Start: 2023-03-31 | End: 2023-03-31

## 2023-03-31 RX ORDER — POLYETHYLENE GLYCOL 3350 17 G/17G
17 POWDER, FOR SOLUTION ORAL DAILY
Status: DISCONTINUED | OUTPATIENT
Start: 2023-03-31 | End: 2023-04-02 | Stop reason: HOSPADM

## 2023-03-31 RX ORDER — FUROSEMIDE 10 MG/ML
40 INJECTION INTRAMUSCULAR; INTRAVENOUS
Status: DISCONTINUED | OUTPATIENT
Start: 2023-03-31 | End: 2023-04-01

## 2023-03-31 RX ORDER — NITROGLYCERIN 0.4 MG/1
0.4 TABLET SUBLINGUAL
Status: DISCONTINUED | OUTPATIENT
Start: 2023-03-31 | End: 2023-04-02 | Stop reason: HOSPADM

## 2023-03-31 RX ADMIN — ATORVASTATIN CALCIUM 80 MG: 40 TABLET, FILM COATED ORAL at 15:32

## 2023-03-31 RX ADMIN — FUROSEMIDE 40 MG: 10 INJECTION, SOLUTION INTRAVENOUS at 17:45

## 2023-03-31 RX ADMIN — POLYETHYLENE GLYCOL 3350 17 G: 17 POWDER, FOR SOLUTION ORAL at 15:32

## 2023-03-31 RX ADMIN — METOPROLOL SUCCINATE 25 MG: 25 TABLET, EXTENDED RELEASE ORAL at 15:31

## 2023-03-31 RX ADMIN — FUROSEMIDE 40 MG: 10 INJECTION, SOLUTION INTRAVENOUS at 11:57

## 2023-03-31 RX ADMIN — RIVAROXABAN 20 MG: 20 TABLET, FILM COATED ORAL at 07:46

## 2023-03-31 RX ADMIN — INSULIN LISPRO 2 UNITS: 100 INJECTION, SOLUTION INTRAVENOUS; SUBCUTANEOUS at 21:43

## 2023-03-31 RX ADMIN — MAGNESIUM SULFATE HEPTAHYDRATE 2 G: 40 INJECTION, SOLUTION INTRAVENOUS at 15:35

## 2023-03-31 RX ADMIN — METOPROLOL SUCCINATE 50 MG: 50 TABLET, EXTENDED RELEASE ORAL at 07:54

## 2023-03-31 RX ADMIN — FUROSEMIDE 40 MG: 10 INJECTION, SOLUTION INTRAVENOUS at 07:46

## 2023-03-31 RX ADMIN — INSULIN LISPRO 2 UNITS: 100 INJECTION, SOLUTION INTRAVENOUS; SUBCUTANEOUS at 11:59

## 2023-03-31 NOTE — ASSESSMENT & PLAN NOTE
· Persistent atrial fibrillation, rate controlled  · Continue Toprol XL 50 mg daily  · Elevated [id number] score continue chronic AC with Xarelto

## 2023-03-31 NOTE — ASSESSMENT & PLAN NOTE
Wt Readings from Last 3 Encounters:   03/31/23 (!) 148 kg (326 lb)   03/20/22 (!) 149 kg (328 lb 6 4 oz)   02/23/22 (!) 150 kg (330 lb 0 5 oz)     · Chronic HFpEF with most recent echo 10/22 normal EF, afib constant during study  · Was maintained on HCTZ but ran out of medication and has not taken in past month  · Presents with orthopnea over past week, +2-3 BLE pitting edema, abdominal distention, dyspnea, JVD  · Grossly hypervolemic on exam  · Initiate IV diuresis - continue lasix IV 40 mg TID as per Cardiology - if patient with no significant improvement over the weekend, can initiate Lasix gtt starting at a rate of 10  · Continue PTA Toprol xl 50 mg daily,   · Admitted to CHF protocol, I/O, daily weights, 2 g sodium diet with fluid restriction  · Consult dietitian  · [address] cardiology consultation

## 2023-03-31 NOTE — H&P
[address]  H&P  Name: Malena Esqueda  MRN: [id number]  Unit/Bed#: -01 I Date of Admission: 3/30/2023   Date of Service: 3/31/2023 I Hospital Day: 1      Assessment/Plan   * Acute on chronic diastolic (congestive) heart failure [facility])  Assessment & Plan  Wt Readings from Last 3 Encounters:   03/30/23 (!) 150 kg (331 lb 2 1 oz)   03/20/22 (!) 149 kg (328 lb 6 4 oz)   02/23/22 (!) 150 kg (330 lb 0 5 oz)     · Chronic HFpEF with most recent echo 10/22 normal EF, afib constant during study  · Was maintained on HCTZ but ran out of medication and has not taken in past month  · Presents with orthopnea over past week, +2-3 BLE pitting edema, abdominal distention, dyspnea, JVD  · Grossly hypervolemic on exam  · Initiate IV diuresis  · Continue PTA Toprol xl 50 mg daily,   · Admitted to CHF protocol, I/O, daily weights, 2 g sodium diet with fluid restriction  · Consult dietitian  · Update echocardiogram  · Appreciate cardiology consultation    Coronary artery disease involving native coronary artery of native heart  Assessment & Plan  · History CAD with stent in place  · Continue Xarelto, BB and high intensity statin  · EKG nonischemic    Class 3 severe obesity due to excess calories with serious comorbidity and body mass index (BMI) of 45 0 to 49 9 in adult [facility])  Assessment & Plan  · BMI 48 9  · Morbid obesity compounding all aspects of healthcare  · Requires intensive lifestyle modification    Type 2 diabetes mellitus with hyperglycemia, without long-term current use of insulin (HCC)  Assessment & Plan  Lab Results   Component Value Date    HGBA1C 7 6 (H) 08/30/2022       Recent Labs     03/30/23  2250   POCGLU 141*       Blood Sugar Average: Last 72 hrs:  (P) 141   · Update hemoglobin A1c  ·  sliding scale insulin Accu-Cheks  · Hypoglycemia protocol  · Metformin, glyburide on hold-resume on discharge    Longstanding persistent atrial fibrillation (HCC)  Assessment & Plan  · Persistent atrial fibrillation, rate controlled  · Continue Toprol XL 50 mg daily  · Elevated [id number] score continue chronic AC with Xarelto         VTE Pharmacologic Prophylaxis: VTE Score: 5 High Risk (Score >/= 5) - Pharmacological DVT Prophylaxis Ordered: rivaroxaban (Xarelto)  Sequential Compression Devices Ordered  Code Status: Level 1 - Full Code   Discussion with family: Patient declined call to   Anticipated Length of Stay: Patient will be admitted on an inpatient basis with an anticipated length of stay of greater than 2 midnights secondary to CHF exacerbation  Total Time Spent on Date of Encounter in care of patient: 65 minutes This time was spent on one or more of the following: performing physical exam; counseling and coordination of care; obtaining or reviewing history; documenting in the medical record; reviewing/ordering tests, medications or procedures; communicating with other healthcare professionals and discussing with patient's family/caregivers  Chief Complaint: Dyspnea, orthopnea    History of Present Illness:  Melyssa Dunaway is a 72 y o  male with a PMH of hypertension, atrial fibrillation chronically anticoagulated with Xarelto, diabetes mellitus, CAD, morbid obesity who presents with 1 week of orthopnea sleeping in a chair, previously could lie flat in his bed  Also reporting abdominal distention, bilateral lower extremity edema, dyspnea  Found to be hypervolemic on exam with pulmonary Rales, JVD, vascular congestion on chest x-ray and pitting extremity edema  Does not know his weight over 1 month ago but has not been taking his hydrochlorothiazide for the past month because he ran out of his medication  Presented to the medical service for further evaluation and treatment of decompensated CHF  Review of Systems:  Review of Systems   Constitutional: Positive for activity change, fatigue and unexpected weight change  Negative for chills and fever     HENT: Negative for ear pain and sore throat  Eyes: Negative for pain and visual disturbance  Respiratory: Positive for shortness of breath  Negative for cough  Cardiovascular: Positive for leg swelling  Negative for chest pain and palpitations  Gastrointestinal: Positive for abdominal distention  Negative for abdominal pain and vomiting  Genitourinary: Negative for dysuria and hematuria  Musculoskeletal: Negative for arthralgias and back pain  Skin: Negative for color change and rash  Neurological: Positive for weakness  Negative for seizures and syncope  Psychiatric/Behavioral: Positive for sleep disturbance  All other systems reviewed and are negative  Past Medical and Surgical History:   Past Medical History:   Diagnosis Date   • Atrial fibrillation ([facility] )    • Diabetes mellitus ([place] [address] )    • Hypertension    • MI (myocardial infarction) ([place] [address] )        Past Surgical History:   Procedure Laterality Date   • CORONARY ANGIOPLASTY WITH STENT PLACEMENT     • ROTATOR CUFF REPAIR         Meds/Allergies:  Prior to Admission medications    Medication Sig Start Date End Date Taking?  Authorizing Provider   glyBURIDE (DIABETA) 5 mg tablet Take 5 mg by mouth in the morning   Yes Historical Provider, MD   metoprolol succinate (TOPROL-XL) 50 mg 24 hr tablet Take 50 mg by mouth daily   Yes Historical Provider, MD   pioglitazone (ACTOS) 45 mg tablet Take 45 mg by mouth in the morning   Yes Historical Provider, MD   rivaroxaban (XARELTO) 20 mg tablet Take 20 mg by mouth daily   Yes Historical Provider, MD   rosuvastatin (CRESTOR) 40 MG tablet Take 40 mg by mouth daily   Yes Historical Provider, MD   atorvastatin (LIPITOR) 40 mg tablet Take 40 mg by mouth daily 1/17/20 1/16/21  Historical Provider, MD   enalapril (VASOTEC) 10 mg tablet Take 10 mg by mouth daily 1/14/20 1/13/21  Historical Provider, MD   glimepiride (AMARYL) 4 mg tablet Take 4 mg by mouth daily 7/15/20 7/15/21  Historical Provider, MD   hydrochlorothiazide "(HYDRODIURIL) 50 mg tablet Take 50 mg by mouth daily 1/14/20 1/13/21  Historical Provider, MD   metFORMIN (GLUCOPHAGE-XR) 500 mg 24 hr tablet Take 1,500 mg by mouth daily 7/15/20   Historical Provider, MD   nitroglycerin (NITROSTAT) 0 4 mg SL tablet Place 0 4 mg under the tongue as needed 7/15/20   Historical Provider, MD   Omega-3 Fatty Acids (fish oil) 1,000 mg Take 1 capsule by mouth daily    Historical Provider, MD     I have reviewed home medications with patient personally  Allergies: No Known Allergies    Social History:  Marital Status:    Occupation: Retired  Patient Pre-hospital Living Situation: Alone  Patient Pre-hospital Level of Mobility: unable to be assessed at time of evaluation  Patient Pre-hospital Diet Restrictions: None  Substance Use History:   Social History     Substance and Sexual Activity   Alcohol Use Not Currently     Social History     Tobacco Use   Smoking Status Never   Smokeless Tobacco Former   • Types: Chew     Social History     Substance and Sexual Activity   Drug Use Never       Family History:  History reviewed  No pertinent family history  Physical Exam:     Vitals:   Blood Pressure: 148/87 (03/31/23 0021)  Pulse: 84 (03/31/23 0021)  Temperature: 97 7 °F (36 5 °C) (03/31/23 0021)  Respirations: 17 (03/30/23 2135)  Height: 5' 9\" (175 3 cm) (03/30/23 2135)  Weight - Scale: (!) 148 kg (326 lb) (03/31/23 0444)  SpO2: 92 % (03/31/23 0021)    Physical Exam  Vitals and nursing note reviewed  Constitutional:       General: He is in acute distress  Appearance: He is well-developed  He is obese  HENT:      Head: Normocephalic and atraumatic  Eyes:      Conjunctiva/sclera: Conjunctivae normal    Neck:      Vascular: JVD present  Cardiovascular:      Rate and Rhythm: Normal rate  Rhythm irregular  Heart sounds: No murmur heard  Pulmonary:      Effort: Respiratory distress present  Breath sounds: Rales present     Abdominal:      General: There is " distension  Palpations: Abdomen is soft  Tenderness: There is no abdominal tenderness  Musculoskeletal:         General: Swelling present  Cervical back: Neck supple  Comments: 2-3 pitting edema lower extremities   Skin:     General: Skin is warm and dry  Capillary Refill: Capillary refill takes less than 2 seconds  Neurological:      General: No focal deficit present  Mental Status: He is alert and oriented to person, place, and time  Psychiatric:         Mood and Affect: Mood normal          Behavior: Behavior normal         Additional Data:     Lab Results:  Results from last 7 days   Lab Units 03/31/23  0443 03/30/23  1924   WBC Thousand/uL 9 69 10 07   HEMOGLOBIN g/dL 12 2 12 9   HEMATOCRIT % 38 6 41 6   PLATELETS Thousands/uL 233 264   NEUTROS PCT %  --  53   LYMPHS PCT %  --  22   MONOS PCT %  --  10   EOS PCT %  --  14*     Results from last 7 days   Lab Units 03/30/23  1924   SODIUM mmol/L 139   POTASSIUM mmol/L 4 5   CHLORIDE mmol/L 101   CO2 mmol/L 32   BUN mg/dL 16   CREATININE mg/dL 0 91   ANION GAP mmol/L 6   CALCIUM mg/dL 9 6   ALBUMIN g/dL 3 9   TOTAL BILIRUBIN mg/dL 0 55   ALK PHOS U/L 58   ALT U/L 20   AST U/L 21   GLUCOSE RANDOM mg/dL 145*     Results from last 7 days   Lab Units 03/30/23  1924   INR  1 94*     Results from last 7 days   Lab Units 03/30/23  2250   POC GLUCOSE mg/dl 141*               Lines/Drains:  Invasive Devices     Peripheral Intravenous Line  Duration           Peripheral IV 03/30/23 Right Antecubital <1 day                    Imaging: Reviewed radiology reports from this admission including: chest xray  XR chest portable   ED Interpretation by Nolan Ellison DO (03/30 1951)   Congestive heart failure          EKG and Other Studies Reviewed on Admission:   · EKG: Atrial fibrillation  HR 80     ** Please Note: This note has been constructed using a voice recognition system   **

## 2023-03-31 NOTE — ASSESSMENT & PLAN NOTE
· History CAD with stent in place  · Continue Xarelto, BB and high intensity statin  · EKG nonischemic

## 2023-03-31 NOTE — ASSESSMENT & PLAN NOTE
· Persistent atrial fibrillation, rate controlled  · Continue Toprol XL 50 mg daily  · Elevated [id number] score continue chronic AC with Xarelto

## 2023-03-31 NOTE — ED NOTES
Pulse ox dropped to 90% while ambulating  Patient denies dyspnea       Lauren Rosales, RN  03/30/23 2001

## 2023-03-31 NOTE — PLAN OF CARE
Problem: PAIN - ADULT  Goal: Verbalizes/displays adequate comfort level or baseline comfort level  Description: Interventions:  - Encourage patient to monitor pain and request assistance  - Assess pain using appropriate pain scale  - Administer analgesics based on type and severity of pain and evaluate response  - Implement non-pharmacological measures as appropriate and evaluate response  - Consider cultural and social influences on pain and pain management  - Notify physician/advanced practitioner if interventions unsuccessful or patient reports new pain  Outcome: Progressing     Problem: INFECTION - ADULT  Goal: Absence or prevention of progression during hospitalization  Description: INTERVENTIONS:  - Assess and monitor for signs and symptoms of infection  - Monitor lab/diagnostic results  - Monitor all insertion sites, i e  indwelling lines, tubes, and drains  - Monitor endotracheal if appropriate and nasal secretions for changes in amount and color  - [place] appropriate cooling/warming therapies per order  - Administer medications as ordered  - Instruct and encourage patient and family to use good hand hygiene technique  - Identify and instruct in appropriate isolation precautions for identified infection/condition  Outcome: Progressing  Goal: Absence of fever/infection during neutropenic period  Description: INTERVENTIONS:  - Monitor WBC    Outcome: Progressing     Problem: SAFETY ADULT  Goal: Patient will remain free of falls  Description: INTERVENTIONS:  - Educate patient/family on patient safety including physical limitations  - Instruct patient to call for assistance with activity   - Consult OT/PT to assist with strengthening/mobility   - Keep Call bell within reach  - Keep bed low and locked with side rails adjusted as appropriate  - Keep care items and personal belongings within reach  - Initiate and maintain comfort rounds  - Make Fall Risk Sign visible to staff  - Offer Toileting every Hours, in advance of need  - Initiate/Maintain alarm  - Obtain necessary fall risk management equipment:   - Apply yellow socks and bracelet for high fall risk patients  - Consider moving patient to room near nurses station  Outcome: Progressing  Goal: Maintain or return to baseline ADL function  Description: INTERVENTIONS:  -  Assess patient's ability to carry out ADLs; assess patient's baseline for ADL function and identify physical deficits which impact ability to perform ADLs (bathing, care of mouth/teeth, toileting, grooming, dressing, etc )  - Assess/evaluate cause of self-care deficits   - Assess range of motion  - Assess patient's mobility; develop plan if impaired  - Assess patient's need for assistive devices and provide as appropriate  - Encourage maximum independence but intervene and supervise when necessary  - Involve family in performance of ADLs  - Assess for home care needs following discharge   - Consider OT consult to assist with ADL evaluation and planning for discharge  - Provide patient education as appropriate  Outcome: Progressing  Goal: Maintains/Returns to pre admission functional level  Description: INTERVENTIONS:  - Perform BMAT or MOVE assessment daily    - Set and communicate daily mobility goal to care team and patient/family/caregiver  - Collaborate with rehabilitation services on mobility goals if consulted  - Perform Range of Motion  times a day  - Reposition patient every  hours    - Dangle patient  times a day  - Stand patient  times a day  - Ambulate patient  times a day  - Out of bed to chair  times a day   - Out of bed for meals  times a day  - Out of bed for toileting  - Record patient progress and toleration of activity level   Outcome: Progressing     Problem: DISCHARGE PLANNING  Goal: Discharge to home or other facility with appropriate resources  Description: INTERVENTIONS:  - Identify barriers to discharge w/patient and caregiver  - Arrange for needed discharge resources and transportation as appropriate  - Identify discharge learning needs (meds, wound care, etc )  - Arrange for interpretive services to assist at discharge as needed  - Refer to Case Management Department for coordinating discharge planning if the patient needs post-hospital services based on physician/advanced practitioner order or complex needs related to functional status, cognitive ability, or social support system  Outcome: Progressing     Problem: Knowledge Deficit  Goal: Patient/family/caregiver demonstrates understanding of disease process, treatment plan, medications, and discharge instructions  Description: Complete learning assessment and assess knowledge base    Interventions:  - Provide teaching at level of understanding  - Provide teaching via preferred learning methods  Outcome: Progressing

## 2023-03-31 NOTE — PROGRESS NOTES
-- Patient:  -- MRN: [id number]  -- Aidin Request ID: [id number]  -- Level of care reserved: [address] [place] [address]  -- Partner Reserved: [facility] , [address] [place] [address] [phone]  -- Clinical needs requested:  -- Geography searched: [phone]  -- Start of Service:  -- Request sent: 10:47am EDT on 3/31/2023 by Jonnie Vogt  -- Partner reserved: 2:41pm EDT on 3/31/2023 by Jonnie Vogt  -- Choice list shared:

## 2023-03-31 NOTE — CONSULTS
Consultation - Cardiology   Parmjit Viramontes 72 y o  male MRN: [id number]  Unit/Bed#: -01 Encounter: [id number]    Assessment/Plan     Assessment:  Acute on chronic decompensated HFpEF due to medication non-compliance   - ran out of his HCTZ about 1 month ago  CAD with h/o PCI in [place]   Perm AF    - Xarelto for [facility]  Obesity Body mass index is 48 14 kg/m²  Probable MIGUEL  T2DM    Plan:  - Increase Lasix to 40 mg IV tid  May need to transition to a Lasix gtt a 10 mg/hr if he needs further escalation in diuretic therapy  - follow I/O and daily wt   - pt has not been collecting his output getting to the floor (urinating in the toilet)  RN made aware   - needs HF education  - continue PTA BB for rate control and Xarelto for [facility]  - Continue asa and statin therapy  - outpatient sleep study    Will need to establish with a new Cardiologist at time of d/c as his [place] Cardiologist has retired  Follows with a [facility] Primary Care Provider  History of Present Illness   Physician Requesting Consult: Nima Alexandra *  Reason for Consult / Principal Problem: Decompensated HF  HPI: Parmjit Viramontes is a 72y o  year old male who presents with decompensated HFpEF    71 yo male who was previously followed by a Cardiologist in [place] how has since retired with a h/o CAD s/p PCI (remote), perm  AF on Xarelto, HFpEF, morbid obesity, T2DM and probable MIGUEL presents to [address] [place] [address] with a 1 month h/o increasing SOB/MARTINEZ, LE edema, abdominal distension and a 30 lb wt gain after running out of his HCTZ  He didn't think he needed to renew his HCTZ when it ran out  In the ED, found to be in decompensated HFpEF and started on intermittent IV Lasix  This AM, reports increase in UOP but has been using the toilet and not collecting his urine  Denies chest pains, palpitations or syncope  SOB may be getting better  Reports a h/o MIGUEL but has never been tested and doesn't have a CPAP machine       Consults    Review of Systems "  Constitutional: Negative  HENT: Negative  Eyes: Negative  Respiratory: Positive for cough and shortness of breath  Negative for chest tightness  Cardiovascular: Positive for leg swelling  Negative for chest pain and palpitations  Gastrointestinal: Positive for abdominal distention  Negative for abdominal pain, diarrhea, nausea and vomiting  Endocrine: Negative  Genitourinary: Negative  Musculoskeletal: Positive for arthralgias  Skin: Negative  Neurological: Negative  Psychiatric/Behavioral: Negative  Historical Information   Past Medical History:   Diagnosis Date   • Atrial fibrillation ([place] [address] )    • Diabetes mellitus ([place] [address] )    • Hypertension    • MI (myocardial infarction) ([place] [address] )      Past Surgical History:   Procedure Laterality Date   • CORONARY ANGIOPLASTY WITH STENT PLACEMENT     • ROTATOR CUFF REPAIR       Social History     Substance and Sexual Activity   Alcohol Use Not Currently     Social History     Substance and Sexual Activity   Drug Use Never     E-Cigarette/Vaping   • E-Cigarette Use Never User      E-Cigarette/Vaping Substances     Social History     Tobacco Use   Smoking Status Never   Smokeless Tobacco Former   • Types: Chew     Family History: History reviewed  No pertinent family history  Meds/Allergies   all current active meds have been reviewed  No Known Allergies    Objective   Vitals: Blood pressure 151/91, pulse 81, temperature 97 5 °F (36 4 °C), resp  rate 17, height 5' 9\" (1 753 m), weight (!) 148 kg (326 lb), SpO2 92 %    Orthostatic Blood Pressures    Flowsheet Row Most Recent Value   Blood Pressure 151/91 filed at 03/31/2023 0754            Intake/Output Summary (Last 24 hours) at 3/31/2023 0933  Last data filed at 3/31/2023 0100  Gross per 24 hour   Intake 240 ml   Output 725 ml   Net -485 ml       Invasive Devices     Peripheral Intravenous Line  Duration           Peripheral IV 03/30/23 Right Antecubital <1 day                Physical Exam  Vitals " reviewed  Constitutional:       General: He is not in acute distress  Appearance: Normal appearance  He is obese  He is not ill-appearing or diaphoretic  Cardiovascular:      Rate and Rhythm: Normal rate  Rhythm irregular  Heart sounds: Heart sounds are distant  No murmur heard  Pulmonary:      Effort: Pulmonary effort is normal       Breath sounds: Rales present  No wheezing or rhonchi  Abdominal:      General: There is distension  Palpations: Abdomen is soft  Tenderness: There is no abdominal tenderness  Musculoskeletal:      Right lower le+ Pitting Edema present  Left lower le+ Pitting Edema present  Skin:     General: Skin is warm and dry  Neurological:      Mental Status: He is alert  Lab Results:   I have personally reviewed pertinent lab results      CBC with diff:   Results from last 7 days   Lab Units 23  0443   WBC Thousand/uL 9 69   RBC Million/uL 4 28   HEMOGLOBIN g/dL 12 2   HEMATOCRIT % 38 6   MCV fL 90   MCH pg 28 5   MCHC g/dL 31 6   RDW % 13 9   MPV fL 8 8*   PLATELETS Thousands/uL 233     CMP:   Results from last 7 days   Lab Units 233 23  1924   SODIUM mmol/L 140 139   CHLORIDE mmol/L 100 101   CO2 mmol/L 35* 32   BUN mg/dL 15 16   CREATININE mg/dL 0 88 0 91   CALCIUM mg/dL 9 4 9 6   AST U/L  --  21   ALT U/L  --  20   ALK PHOS U/L  --  58   EGFR ml/min/1 73sq m 86 84     HS Troponin:   0   Lab Value Date/Time    HSTNI0 7 2023 1924    HSTNI2 7 2023 2205    HSTNI4 7 2023 0018     BNP:   Results from last 7 days   Lab Units 23  0443   POTASSIUM mmol/L 3 9   CHLORIDE mmol/L 100   CO2 mmol/L 35*   BUN mg/dL 15   CREATININE mg/dL 0 88   CALCIUM mg/dL 9 4   EGFR ml/min/1 73sq m 86     Coags:   Results from last 7 days   Lab Units 23  192   PTT seconds 42*   INR  1 94*     TSH:   Results from last 7 days   Lab Units 23  0443   TSH 3RD GENERATON uIU/mL 1 889     Magnesium:   Results from last 7 days   Lab Units 03/31/23  0443   MAGNESIUM mg/dL 1 7*     Lipid Profile:   Results from last 7 days   Lab Units 03/31/23  0443   HDL mg/dL 34*   LDL CALC mg/dL 39   TRIGLYCERIDES mg/dL 50     Imaging: I have personally reviewed pertinent reports  EKG: AF with CVR      Code Status: Level 1 - Full Code       Counseling / Coordination of Care  Total floor / unit time spent today 40 minutes  Greater than 50% of total time was spent with the patient and / or family counseling and / or coordination of care  A description of the counseling / coordination of care: HF education and talking to CM about discharge planning

## 2023-03-31 NOTE — ASSESSMENT & PLAN NOTE
Lab Results   Component Value Date    HGBA1C 7 6 (H) 08/30/2022       Recent Labs     03/30/23  2250   POCGLU 141*       Blood Sugar Average: Last 72 hrs:  (P) 141   · Update hemoglobin A1c  ·  sliding scale insulin Accu-Cheks  · Hypoglycemia protocol  · Metformin, glyburide on hold-resume on discharge

## 2023-03-31 NOTE — PROGRESS NOTES
[address]  Progress Note  Name: Janie Venegas  MRN: [id number]  Unit/Bed#: -01 I Date of Admission: 3/30/2023   Date of Service: 3/31/2023 I Hospital Day: 1    Assessment/Plan   Coronary artery disease involving native coronary artery of native heart  Assessment & Plan  · History CAD with stent in place  · Continue Xarelto, BB and high intensity statin  · EKG nonischemic    Class 3 severe obesity due to excess calories with serious comorbidity and body mass index (BMI) of 45 0 to 49 9 in adult [facility])  Assessment & Plan  · BMI 48 9  · Morbid obesity compounding all aspects of healthcare  · Requires intensive lifestyle modification    Type 2 diabetes mellitus with hyperglycemia, without long-term current use of insulin [facility])  Assessment & Plan  Lab Results   Component Value Date    HGBA1C 7 6 (H) 08/30/2022       Recent Labs     03/30/23  2250 03/31/23  0705 03/31/23  1059   POCGLU 141* 122 159*       Blood Sugar Average: Last 72 hrs:  (P) 583 [id number]   · Update hemoglobin A1c  ·  sliding scale insulin Accu-Cheks  · Hypoglycemia protocol  · Metformin, glyburide on hold-resume on discharge    Longstanding persistent atrial fibrillation (HCC)  Assessment & Plan  · Persistent atrial fibrillation, rate controlled  · Continue Toprol XL 50 mg daily  · Elevated [id number] score continue chronic AC with Xarelto    * Acute on chronic diastolic (congestive) heart failure (HCC)  Assessment & Plan  Wt Readings from Last 3 Encounters:   03/31/23 (!) 148 kg (326 lb)   03/20/22 (!) 149 kg (328 lb 6 4 oz)   02/23/22 (!) 150 kg (330 lb 0 5 oz)     · Chronic HFpEF with most recent echo 10/22 normal EF, afib constant during study  · Was maintained on HCTZ but ran out of medication and has not taken in past month  · Presents with orthopnea over past week, +2-3 BLE pitting edema, abdominal distention, dyspnea, JVD  · Grossly hypervolemic on exam  · Initiate IV diuresis - continue lasix IV 40 mg TID as per Cardiology - if patient with no significant improvement over the weekend, can initiate Lasix gtt starting at a rate of 10  · Continue PTA Toprol xl 50 mg daily,   · Admitted to CHF protocol, I/O, daily weights, 2 g sodium diet with fluid restriction  · Consult dietitian  · [address] cardiology consultation           VTE Pharmacologic Prophylaxis: VTE Score: 5 Moderate Risk (Score 3-4) - Pharmacological DVT Prophylaxis Ordered: rivaroxaban (Xarelto)  Patient Centered Rounds: I performed bedside rounds with nursing staff today  Discussions with Specialists or Other Care Team Provider: Cardiology    Education and Discussions with Family / Patient: patient  Total Time Spent on Date of Encounter in care of patient: 45 minutes This time was spent on one or more of the following: performing physical exam; counseling and coordination of care; obtaining or reviewing history; documenting in the medical record; reviewing/ordering tests, medications or procedures; communicating with other healthcare professionals and discussing with patient's family/caregivers  Current Length of Stay: 1 day(s)  Current Patient Status: Inpatient   Certification Statement: The patient will continue to require additional inpatient hospital stay due to CHF requiring IV diuresis  Discharge Plan: Anticipate discharge in 48-72 hrs to home with home services  Code Status: Level 1 - Full Code    Subjective:   Patient seen and examined at bedside  Endorses feeling significantly better than when he first came in  Right now feels comfortable at rest but states he has not yet tried overexerting himself  Objective:     Vitals:   Temp (24hrs), Av 5 °F (36 4 °C), Min:97 1 °F (36 2 °C), Max:97 7 °F (36 5 °C)    Temp:  [97 1 °F (36 2 °C)-97 7 °F (36 5 °C)] 97 5 °F (36 4 °C)  HR:  [70-98] 70  Resp:  [17-20] 17  BP: (148-179)/() 150/90  SpO2:  [90 %-96 %] 93 %  Body mass index is 48 14 kg/m²       Input and Output Summary (last 24 hours): Intake/Output Summary (Last 24 hours) at 3/31/2023 1334  Last data filed at 3/31/2023 1309  Gross per 24 hour   Intake 420 ml   Output 2700 ml   Net -2280 ml       Physical Exam:   Physical Exam  Vitals and nursing note reviewed  Constitutional:       General: He is not in acute distress  Appearance: He is well-developed  He is obese  HENT:      Head: Normocephalic and atraumatic  Eyes:      Conjunctiva/sclera: Conjunctivae normal    Cardiovascular:      Rate and Rhythm: Normal rate and regular rhythm  Heart sounds: No murmur heard  Comments: 3+ pitting edema bilateral lower extremities  Pulmonary:      Effort: Pulmonary effort is normal  No respiratory distress  Breath sounds: Normal breath sounds  No rales  Abdominal:      Palpations: Abdomen is soft  Tenderness: There is no abdominal tenderness  Musculoskeletal:         General: No swelling  Cervical back: Neck supple  Skin:     General: Skin is warm and dry  Capillary Refill: Capillary refill takes less than 2 seconds  Neurological:      Mental Status: He is alert     Psychiatric:         Mood and Affect: Mood normal             Additional Data:     Labs:  Results from last 7 days   Lab Units 03/31/23 0443 03/30/23 1924   WBC Thousand/uL 9 69 10 07   HEMOGLOBIN g/dL 12 2 12 9   HEMATOCRIT % 38 6 41 6   PLATELETS Thousands/uL 233 264   NEUTROS PCT %  --  53   LYMPHS PCT %  --  22   MONOS PCT %  --  10   EOS PCT %  --  14*     Results from last 7 days   Lab Units 03/31/23 0443 03/30/23 1924   SODIUM mmol/L 140 139   POTASSIUM mmol/L 3 9 4 5   CHLORIDE mmol/L 100 101   CO2 mmol/L 35* 32   BUN mg/dL 15 16   CREATININE mg/dL 0 88 0 91   ANION GAP mmol/L 5 6   CALCIUM mg/dL 9 4 9 6   ALBUMIN g/dL  --  3 9   TOTAL BILIRUBIN mg/dL  --  0 55   ALK PHOS U/L  --  58   ALT U/L  --  20   AST U/L  --  21   GLUCOSE RANDOM mg/dL 130 145*     Results from last 7 days   Lab Units 03/30/23 1924   INR  1 94* Results from last 7 days   Lab Units 03/31/23  1059 03/31/23  0705 03/30/23  2250   POC GLUCOSE mg/dl 159* 122 141*               Lines/Drains:  Invasive Devices     Peripheral Intravenous Line  Duration           Peripheral IV 03/30/23 Right Antecubital <1 day                      Imaging: Reviewed radiology reports from this admission including: chest xray    Recent Cultures (last 7 days):         Last 24 Hours Medication List:   Current Facility-Administered Medications   Medication Dose Route Frequency Provider Last Rate   • acetaminophen  650 mg Oral Q6H PRN Tia S Kirk, CRNP     • atorvastatin  80 mg Oral Daily With [employer] S Kirk, CRNP     • furosemide  40 mg Intravenous TID (diuretic) Shahab Dolan MD     • insulin lispro  2-12 Units Subcutaneous TID AC Tia S Kirk, CRNP     • insulin lispro  2-12 Units Subcutaneous HS Tia S Kirk, CRNP     • melatonin  3 mg Oral HS PRN Tia S Kirk, CRNP     • metoprolol succinate  50 mg Oral Daily Tia S Kirk, CRNP     • rivaroxaban  20 mg Oral Daily With Breakfast Tia S Kirk, CRNP          Today, Patient Was Seen By: Haily Cooley MD    **Please Note: This note may have been constructed using a voice recognition system  **

## 2023-03-31 NOTE — ASSESSMENT & PLAN NOTE
Wt Readings from Last 3 Encounters:   03/30/23 (!) 150 kg (331 lb 2 1 oz)   03/20/22 (!) 149 kg (328 lb 6 4 oz)   02/23/22 (!) 150 kg (330 lb 0 5 oz)     · Chronic HFpEF with most recent echo 10/22 normal EF, afib constant during study  · Was maintained on HCTZ but ran out of medication and has not taken in past month  · Presents with orthopnea over past week, +2-3 BLE pitting edema, abdominal distention, dyspnea, JVD  · Grossly hypervolemic on exam  · Initiate IV diuresis  · Continue PTA Toprol xl 50 mg daily,   · Admitted to CHF protocol, I/O, daily weights, 2 g sodium diet with fluid restriction  · Consult dietitian  · Update echocardiogram  · Appreciate cardiology consultation

## 2023-03-31 NOTE — PLAN OF CARE
Problem: PAIN - ADULT  Goal: Verbalizes/displays adequate comfort level or baseline comfort level  Description: Interventions:  - Encourage patient to monitor pain and request assistance  - Assess pain using appropriate pain scale  - Administer analgesics based on type and severity of pain and evaluate response  - Implement non-pharmacological measures as appropriate and evaluate response  - Consider cultural and social influences on pain and pain management  - Notify physician/advanced practitioner if interventions unsuccessful or patient reports new pain  Outcome: Progressing     Problem: INFECTION - ADULT  Goal: Absence or prevention of progression during hospitalization  Description: INTERVENTIONS:  - Assess and monitor for signs and symptoms of infection  - Monitor lab/diagnostic results  - Monitor all insertion sites, i e  indwelling lines, tubes, and drains  - Monitor endotracheal if appropriate and nasal secretions for changes in amount and color  - [place] appropriate cooling/warming therapies per order  - Administer medications as ordered  - Instruct and encourage patient and family to use good hand hygiene technique  - Identify and instruct in appropriate isolation precautions for identified infection/condition  Outcome: Progressing  Goal: Absence of fever/infection during neutropenic period  Description: INTERVENTIONS:  - Monitor WBC    Outcome: Progressing     Problem: DISCHARGE PLANNING  Goal: Discharge to home or other facility with appropriate resources  Description: INTERVENTIONS:  - Identify barriers to discharge w/patient and caregiver  - Arrange for needed discharge resources and transportation as appropriate  - Identify discharge learning needs (meds, wound care, etc )  - Arrange for interpretive services to assist at discharge as needed  - Refer to Case Management Department for coordinating discharge planning if the patient needs post-hospital services based on physician/advanced practitioner order or complex needs related to functional status, cognitive ability, or social support system  Outcome: Progressing     Problem: Knowledge Deficit  Goal: Patient/family/caregiver demonstrates understanding of disease process, treatment plan, medications, and discharge instructions  Description: Complete learning assessment and assess knowledge base    Interventions:  - Provide teaching at level of understanding  - Provide teaching via preferred learning methods  Outcome: Progressing

## 2023-03-31 NOTE — CASE MANAGEMENT
Case Management Assessment & Discharge Planning Note    Patient name Owen Campbell  Location [address] 330/-01 MRN [id number]  : 1950 Date 3/31/2023       Current Admission Date: 3/30/2023  Current Admission Diagnosis:Acute on chronic diastolic (congestive) heart failure [facility])   Patient Active Problem List    Diagnosis Date Noted   • Coronary artery disease involving native coronary artery of native heart 2023   • Longstanding persistent atrial fibrillation ([facility] ) 2023   • Type 2 diabetes mellitus with hyperglycemia, without long-term current use of insulin ([facility] ) 2023   • Class 3 severe obesity due to excess calories with serious comorbidity and body mass index (BMI) of 45 0 to 49 9 in adult [facility]) 2023   • Acute on chronic diastolic (congestive) heart failure ([facility] ) 2023      LOS (days): 1  Geometric Mean LOS (GMLOS) (days): 3 90  Days to GMLOS:3 2     OBJECTIVE:    Risk of Unplanned Readmission Score: 9 69         Current admission status: Inpatient       Preferred Pharmacy:   [address] [place] [id number]  Phone: [phone] Fax: [phone]    Primary Care Provider: Karen Landrum DO    Primary Insurance: [address] [place] [address]  Secondary Insurance:     ASSESSMENT:  Albino Bentley Proxies    There are no active Health Care Proxies on file                        Patient Information  Admitted from[de-identified] Home  Mental Status: Alert  During Assessment patient was accompanied by: Daughter  Assessment information provided by[de-identified] Patient, Daughter  Primary Caregiver: Self  Support Systems: Family members, Children, [address] [place] [address] of Residence: [place]  Type of Current Residence: [facility]  In the last 12 months, was there a time when you were not able to pay the mortgage or rent on time?: No  In the last 12 months, how many places have you lived?: 1  In the last 12 months, was there a time when you did not have a steady place to sleep or slept in a shelter (including now)?: No  Living Arrangements: Lives Alone    Activities of Daily Living Prior to Admission  Functional Status: Independent  Completes ADLs independently?: Yes  Ambulates independently?: Yes  Does patient use assisted devices?: No  Does patient currently own DME?: No  Does patient have a history of Outpatient Therapy (PT/OT)?: No  Does the patient have a history of Short-Term Rehab?: No  Does patient have a history of HHC?: No  Does patient currently have [facility]?: No         Patient Information Continued  Does patient have prescription coverage?: Yes  Within the past 12 months, you worried that your food would run out before you got the money to buy more : Never true  Within the past 12 months, the food you bought just didn't last and you didn't have money to get more : Never true  Food insecurity resource given?: No  Does patient receive dialysis treatments?: No  Does patient have a history of substance abuse?: No  Does patient have a history of Mental Health Diagnosis?: No         Means of Transportation  Means of Transport to Appts[de-identified] Drives Self  In the past 12 months, has lack of transportation kept you from medical appointments or from getting medications?: No  In the past 12 months, has lack of transportation kept you from meetings, work, or from getting things needed for daily living?: No        DISCHARGE DETAILS:    Discharge planning discussed with[de-identified] patient and pts kailey reddy  Freedom of Choice: Yes     CM contacted family/caregiver?: Yes  Were Treatment Team discharge recommendations reviewed with patient/caregiver?: Yes  Did patient/caregiver verbalize understanding of patient care needs?: Yes  Were patient/caregiver advised of the risks associated with not following Treatment Team discharge recommendations?: Yes    Contacts  Patient Contacts: kailey reddy  Relationship to Patient[de-identified] Family  Contact Method:  In Person  Reason/Outcome: Discharge Planning                    Pt would like to change his cardiologist and PCP to either Melissa Mccartney or Waqas Tompkins  Pt is agreeable to have CM set up pt with both providers      PARAS SAAVEDRA for [place], requesting a return call to schedule pt to establish primary care, awaiting return call    CM made pt an appointment with [facility], [facility] clinic, on April 18, 2023 at 1115, AVS updated

## 2023-03-31 NOTE — UTILIZATION REVIEW
Initial Clinical Review    Admission: Date/Time/Statement:   Admission Orders (From admission, onward)     Ordered        03/30/23 2048  INPATIENT ADMISSION  Once                      Orders Placed This Encounter   Procedures   • INPATIENT ADMISSION     Standing Status:   Standing     Number of Occurrences:   1     Order Specific Question:   Level of Care     Answer:   Med Surg [16]     Order Specific Question:   Estimated length of stay     Answer:   More than 2 Midnights     Order Specific Question:   Certification     Answer:   I certify that inpatient services are medically necessary for this patient for a duration of greater than two midnights  See H&P and MD Progress Notes for additional information about the patient's course of treatment  ED Arrival Information     Expected   -    Arrival   3/30/2023 18:09    Acuity   Urgent            Means of arrival   Walk-In    Escorted by   Family Member    Service   Hospitalist    Admission type   Emergency            Arrival complaint   vomiting           Chief Complaint   Patient presents with   • Shortness of Breath     Since Monday feeling SOB and bilateral lower leg swelling, denies fever/cough, no hx of CHF       Initial Presentation: 72 y o  male to ED via walk in from home  Present with a PMHX of hypertension, atrial fibrillation chronically anticoagulated with Xarelto, diabetes mellitus, CAD, morbid obesity who presents with 1 week of orthopnea sleeping in a chair, previously could lie flat in his bed  Also reporting abdominal distention, bilateral lower extremity edema, dyspnea  Admitted to M/S with DX: Acute on chronic diastolic (congestive) heart failure    on exam: in acute distress; MARTINEZ; orthopnea; hypervolemic, pulmonary Rales, (+) JVD; pitting extremity edema - 2-3+ bilat LE; Limited ROM; VBG PCO2 55 3; PO2 28 5; HCO3 32 2;   CXR =  vascular congestion  Given in ED SL Nitro;  Lasix iv  PLAN: cont lasix; I/O; daily wts; fluid restriction; Accuchecks with ssic; monitor labs; cardiology consult; continuous pulse ox      Date: 3/31/23    Day 2  Endorses feeling significantly better than when he first came in  Right now feels comfortable at rest but states he has not yet tried overexerting himself  Lungs diminished; B/L LE edema +2; Limited ROM; Mg 1 7  Plan: cont lasix; I/O; daily wts; fluid restriction;  Accuchecks with ssic; monitor labs; continuous pulse ox    CARDIOLOGY CONSULT: Acute on chronic decompensated HFpEF due to medication non-compliance  ran out of his HCTZ about 1 month ago  Plan: inc  Lasix iv to 40 tid; HF education; cont PTA BB for rate control and Xarelto; cont asa and statin      ED Triage Vitals   Temperature Pulse Respirations Blood Pressure SpO2   03/30/23 1854 03/30/23 1854 03/30/23 1854 03/30/23 1854 03/30/23 1854   (!) 97 1 °F (36 2 °C) 81 20 (!) 179/119 96 %      Temp Source Heart Rate Source Patient Position - Orthostatic VS BP Location FiO2 (%)   03/31/23 1141 03/30/23 1854 -- 03/31/23 1141 --   Temporal Monitor  Left arm       Pain Score       03/30/23 2135       2          Wt Readings from Last 1 Encounters:   03/31/23 (!) 148 kg (326 lb)     Wt Readings from Last 3 Encounters:   03/30/23 (!) 150 kg (331 lb 2 1 oz)   03/20/22 (!) 149 kg (328 lb 6 4 oz)   02/23/22 (!) 150 kg (330 lb 0 5 oz)       Additional Vital Signs:   Date/Time Temp Pulse Resp BP MAP (mmHg) SpO2 O2 Device   03/31/23 07:54:25 -- 81 -- 151/91 111 92 % --   03/31/23 0754 -- -- -- 151/91 -- -- --   03/31/23 07:51:17 97 5 °F (36 4 °C) 86 -- -- 130 92 % --   03/31/23 00:21:37 97 7 °F (36 5 °C) 84 -- 148/87 107 92 % --   03/30/23 2135 97 5 °F (36 4 °C) 81 17 151/87 108 93 % None (Room air)   03/30/23 21:24:08 -- 98 17 151/87 108 90 % --   03/30/23 1854 97 1 °F (36 2 °C) Abnormal  81 20 179/119 Abnormal  -- 96 % None (Room air)         EKG:     Pertinent Labs/Diagnostic Test Results:   XR chest portable   ED Interpretation by Duane Saucier, DO (03/30 1951) Congestive heart failure      Final Result by Juliann Boateng MD (03/31 9818)   Pulmonary edema        Workstation performed: [id number]               Results from last 7 days   Lab Units 03/31/23  0443 03/30/23 1924   WBC Thousand/uL 9 69 10 07   HEMOGLOBIN g/dL 12 2 12 9   HEMATOCRIT % 38 6 41 6   PLATELETS Thousands/uL 233 264   NEUTROS ABS Thousands/µL  --  5 40         Results from last 7 days   Lab Units 03/31/23  0443 03/30/23  1924   SODIUM mmol/L 140 139   POTASSIUM mmol/L 3 9 4 5   CHLORIDE mmol/L 100 101   CO2 mmol/L 35* 32   ANION GAP mmol/L 5 6   BUN mg/dL 15 16   CREATININE mg/dL 0 88 0 91   EGFR ml/min/1 73sq m 86 84   CALCIUM mg/dL 9 4 9 6   MAGNESIUM mg/dL 1 7*  --      Results from last 7 days   Lab Units 03/30/23 1924   AST U/L 21   ALT U/L 20   ALK PHOS U/L 58   TOTAL PROTEIN g/dL 7 2   ALBUMIN g/dL 3 9   TOTAL BILIRUBIN mg/dL 0 55     Results from last 7 days   Lab Units 03/31/23  1059 03/31/23  0705 03/30/23  2250   POC GLUCOSE mg/dl 159* 122 141*     Results from last 7 days   Lab Units 03/31/23  0443 03/30/23  1924   GLUCOSE RANDOM mg/dL 130 145*       BETA-HYDROXYBUTYRATE   Date Value Ref Range Status   03/30/2023 0 2 <0 6 mmol/L Final          Results from last 7 days   Lab Units 03/30/23 1924   PH TEDDY  7 383   PCO2 TEDDY mm Hg 55 3*   PO2 TEDDY mm Hg 28 5*   HCO3 TEDDY mmol/L 32 2*   BASE EXC TEDDY mmol/L 5 6   O2 CONTENT TEDDY ml/dL 10 5   O2 HGB, VENOUS % 54 1*             Results from last 7 days   Lab Units 03/31/23  0018 03/30/23  2205 03/30/23 1924   HS TNI 0HR ng/L  --   --  7   HS TNI 2HR ng/L  --  7  --    HSTNI D2 ng/L  --  0  --    HS TNI 4HR ng/L 7  --   --    HSTNI D4 ng/L 0  --   --          Results from last 7 days   Lab Units 03/30/23 1924   PROTIME seconds 22 3*   INR  1 94*   PTT seconds 42*     Results from last 7 days   Lab Units 03/31/23  0443   TSH 3RD GENERATON uIU/mL 1 889       Results from last 7 days   Lab Units 03/30/23  1924   BNP pg/mL 210*     ED Treatment: Medication Administration from 03/30/2023 1806 to 03/30/2023 2115       Date/Time Order Dose Route Action     03/30/2023 1924 EDT nitroglycerin (NITROSTAT) SL tablet 0 4 mg 0 4 mg Sublingual Given     03/30/2023 1935 EDT furosemide (LASIX) injection 40 mg 40 mg Intravenous Given          Present on Admission:  • Longstanding persistent atrial fibrillation (HCC)  • Type 2 diabetes mellitus with hyperglycemia, without long-term current use of insulin (HCC)  • Acute on chronic diastolic (congestive) heart failure (HCC)      Admitting Diagnosis: Acute congestive heart failure, unspecified heart failure type ([facility] ) [I50 9]     Age/Sex: 72 y o  male     Admission Orders:  SCD's; I/O; daily wts; Consistent Carbohydrate Diet  Accu-checks ACHS  Fluid restriction 1500    Scheduled Medications:  atorvastatin, 80 mg, Oral, Daily With Dinner  furosemide, 40 mg, Intravenous, TID (diuretic)  insulin lispro, 2-12 Units, Subcutaneous, TID AC  insulin lispro, 2-12 Units, Subcutaneous, HS  metoprolol succinate, 50 mg, Oral, Daily  rivaroxaban, 20 mg, Oral, Daily With Breakfast      Continuous IV Infusions: None     PRN Meds:  acetaminophen, 650 mg, Oral, Q6H PRN  melatonin, 3 mg, Oral, HS PRN        IP CONSULT TO NUTRITION SERVICES  IP CONSULT TO CARDIOLOGY    Network Utilization Review Department  ATTENTION: Please call with any questions or concerns to [phone] and carefully listen to the prompts so that you are directed to the right person  All voicemails are confidential   Mallika Pappas all requests for admission clinical reviews, approved or denied determinations and any other requests to dedicated fax number below belonging to the campus where the patient is receiving treatment   List of dedicated fax numbers for the Facilities:  [address] DENIALS (Administrative/Medical Necessity) [phone]   [address]  (Maternity/NICU/Pediatrics) Asmita Gipson [address] [place] Jane [place] 77 [phone]   [address] [place] [address] [place]  [phone]   [address] [place] 134 815 [facility] [phone]

## 2023-03-31 NOTE — CASE MANAGEMENT
Case Management Discharge Planning Note    Patient name Isaac Gramajo  Location [address] 330/-01 MRN [id number]  : 1950 Date 3/31/2023       Current Admission Date: 3/30/2023  Current Admission Diagnosis:Acute on chronic diastolic (congestive) heart failure [facility])   Patient Active Problem List    Diagnosis Date Noted   • Coronary artery disease involving native coronary artery of native heart 2023   • Longstanding persistent [facility] ([facility] ) 2023   • Type 2 diabetes mellitus with hyperglycemia, without long-term current use of insulin ([place] [address] ) 2023   • Class 3 severe obesity due to excess calories with serious comorbidity and body mass index (BMI) of 45 0 to 49 9 in adult [facility]) 2023   • Acute on chronic diastolic (congestive) heart failure ([facility] ) 2023      LOS (days): 1  Geometric Mean LOS (GMLOS) (days): 3 90  Days to GMLOS:3 2     OBJECTIVE:  Risk of Unplanned Readmission Score: 9 69         Current admission status: Inpatient   Preferred Pharmacy:   [address] [place] [id number]  Phone: [phone] Fax: [phone]    Primary Care Provider: Rogerio Hernández DO    Primary Insurance: [address] [address] [address] [facility]  Secondary Insurance:     DISCHARGE DETAILS:                    HHC has been recommended for pt at time of discharge  Pt is agreeable to [facility] discussing HHC with pt, pt does not have a preference to any HHC, CM placing a blanket referral in [facility] has accepted pt at time of discharge

## 2023-03-31 NOTE — CONSULTS
"Consult received for CHF Ed  Pt reports he avoids the salt shaker at home, uses a lot of pepper instead  Enjoys corn, broccoli, cauliflower and tries to eat \"a lot\" of vegetables, makes homemade vegetable soup  Does admit to consumption of high sodium foods like cheeses, processed meats, spaghetti Os, V8 juice \"I was drinking a lot of that lately  \"     Discussed importance of low sodium diet compliance at home  Discussed high sodium foods to avoid such as processed meats/foods/original V8 juice and appropriate low sodium alternatives, modified HF nutrition therapy handout to include appropriate food choices for DM  Discussed current fluid restriction  Pt says he was not weighing himself at home, encouraged daily weights and to report significant weight increases to cardiologist      Pt says he is having issues straining when having a BM, requesting stool softener  Bowel regimen per MD      Continue diet as ordered, fluid restriction per MD      Provided with HF Nutrition Therapy and Salt Free Seasoning tips handouts along with RD contact information     "

## 2023-03-31 NOTE — ASSESSMENT & PLAN NOTE
· BMI 48 9  · Morbid obesity compounding all aspects of healthcare  · Requires intensive lifestyle modification

## 2023-04-01 LAB
ANION GAP SERPL CALCULATED.3IONS-SCNC: 6 MMOL/L (ref 4–13)
BUN SERPL-MCNC: 15 MG/DL (ref 5–25)
CALCIUM SERPL-MCNC: 9.3 MG/DL (ref 8.4–10.2)
CHLORIDE SERPL-SCNC: 96 MMOL/L (ref 96–108)
CO2 SERPL-SCNC: 36 MMOL/L (ref 21–32)
CREAT SERPL-MCNC: 0.83 MG/DL (ref 0.6–1.3)
EST. AVERAGE GLUCOSE BLD GHB EST-MCNC: 166 MG/DL
GFR SERPL CREATININE-BSD FRML MDRD: 88 ML/MIN/1.73SQ M
GLUCOSE SERPL-MCNC: 146 MG/DL (ref 65–140)
GLUCOSE SERPL-MCNC: 146 MG/DL (ref 65–140)
GLUCOSE SERPL-MCNC: 155 MG/DL (ref 65–140)
GLUCOSE SERPL-MCNC: 196 MG/DL (ref 65–140)
GLUCOSE SERPL-MCNC: 203 MG/DL (ref 65–140)
GLUCOSE SERPL-MCNC: 229 MG/DL (ref 65–140)
HBA1C MFR BLD: 7.4 %
MAGNESIUM SERPL-MCNC: 2 MG/DL (ref 1.9–2.7)
POTASSIUM SERPL-SCNC: 3.8 MMOL/L (ref 3.5–5.3)
SODIUM SERPL-SCNC: 138 MMOL/L (ref 135–147)

## 2023-04-01 RX ORDER — FUROSEMIDE 10 MG/ML
40 INJECTION INTRAMUSCULAR; INTRAVENOUS
Status: DISCONTINUED | OUTPATIENT
Start: 2023-04-01 | End: 2023-04-02 | Stop reason: HOSPADM

## 2023-04-01 RX ORDER — SENNOSIDES 8.6 MG
1 TABLET ORAL
Status: DISCONTINUED | OUTPATIENT
Start: 2023-04-01 | End: 2023-04-02 | Stop reason: HOSPADM

## 2023-04-01 RX ORDER — DOCUSATE SODIUM 100 MG/1
100 CAPSULE, LIQUID FILLED ORAL 2 TIMES DAILY
Status: DISCONTINUED | OUTPATIENT
Start: 2023-04-01 | End: 2023-04-02 | Stop reason: HOSPADM

## 2023-04-01 RX ORDER — POTASSIUM CHLORIDE 20 MEQ/1
20 TABLET, EXTENDED RELEASE ORAL DAILY
Status: DISCONTINUED | OUTPATIENT
Start: 2023-04-01 | End: 2023-04-02 | Stop reason: HOSPADM

## 2023-04-01 RX ADMIN — INSULIN LISPRO 2 UNITS: 100 INJECTION, SOLUTION INTRAVENOUS; SUBCUTANEOUS at 08:00

## 2023-04-01 RX ADMIN — ATORVASTATIN CALCIUM 80 MG: 40 TABLET, FILM COATED ORAL at 15:47

## 2023-04-01 RX ADMIN — RIVAROXABAN 20 MG: 20 TABLET, FILM COATED ORAL at 08:00

## 2023-04-01 RX ADMIN — FUROSEMIDE 40 MG: 10 INJECTION, SOLUTION INTRAVENOUS at 05:14

## 2023-04-01 RX ADMIN — METOPROLOL SUCCINATE 75 MG: 50 TABLET, EXTENDED RELEASE ORAL at 08:00

## 2023-04-01 RX ADMIN — INSULIN LISPRO 2 UNITS: 100 INJECTION, SOLUTION INTRAVENOUS; SUBCUTANEOUS at 22:03

## 2023-04-01 RX ADMIN — POLYETHYLENE GLYCOL 3350 17 G: 17 POWDER, FOR SOLUTION ORAL at 08:00

## 2023-04-01 RX ADMIN — POTASSIUM CHLORIDE 20 MEQ: 1500 TABLET, EXTENDED RELEASE ORAL at 12:40

## 2023-04-01 RX ADMIN — STANDARDIZED SENNA CONCENTRATE 8.6 MG: 8.6 TABLET ORAL at 21:40

## 2023-04-01 RX ADMIN — DOCUSATE SODIUM 100 MG: 100 CAPSULE ORAL at 17:04

## 2023-04-01 RX ADMIN — FUROSEMIDE 40 MG: 10 INJECTION, SOLUTION INTRAVENOUS at 16:00

## 2023-04-01 RX ADMIN — INSULIN LISPRO 2 UNITS: 100 INJECTION, SOLUTION INTRAVENOUS; SUBCUTANEOUS at 12:09

## 2023-04-01 NOTE — ASSESSMENT & PLAN NOTE
Wt Readings from Last 3 Encounters:   04/01/23 (!) 141 kg (311 lb 3 2 oz)   03/20/22 (!) 149 kg (328 lb 6 4 oz)   02/23/22 (!) 150 kg (330 lb 0 5 oz)     · Chronic HFpEF with most recent echo 10/22 normal EF, afib constant during study  · Was maintained on HCTZ but ran out of medication a month ago  · Presents with orthopnea over past week, Grossly hypervolemic on exam-+2-3 BLE pitting edema, abdominal distention, dyspnea, JVD  · Appreciate cardiology consultation  · Per cardiology lasix IV 40 mg TID- if patient with no significant improvement over the weekend, can initiate Lasix gtt starting at a rate of 10  · Reduce to lasix 40mg BID- diuresing well 6L last 24hr -15lb  · Reports baseline weight around 309lb  · Continue BB  ·  I/O, daily weights, 2 g sodium diet with fluid restriction  · Consult dietitian

## 2023-04-01 NOTE — PROGRESS NOTES
[address]  Progress Note  Name: Grace Scott  MRN: [id number]  Unit/Bed#: -01 I Date of Admission: 3/30/2023   Date of Service: 4/1/2023 I Hospital Day: 2    Assessment/Plan   * Acute on chronic diastolic (congestive) heart failure [facility])  Assessment & Plan  Wt Readings from Last 3 Encounters:   04/01/23 (!) 141 kg (311 lb 3 2 oz)   03/20/22 (!) 149 kg (328 lb 6 4 oz)   02/23/22 (!) 150 kg (330 lb 0 5 oz)     · Chronic HFpEF with most recent echo 10/22 normal EF, afib constant during study  · Was maintained on HCTZ but ran out of medication a month ago  · Presents with orthopnea over past week, Grossly hypervolemic on exam-+2-3 BLE pitting edema, abdominal distention, dyspnea, JVD  · Appreciate cardiology consultation  · Per cardiology lasix IV 40 mg TID- if patient with no significant improvement over the weekend, can initiate Lasix gtt starting at a rate of 10  · Reduce to lasix 40mg BID- diuresing well 6L last 24hr -15lb  · Reports baseline weight around 309lb  · Continue BB  ·  I/O, daily weights, 2 g sodium diet with fluid restriction  · Consult dietitian      Coronary artery disease involving native coronary artery of native heart  Assessment & Plan  · History CAD with stent in place  · Continue Xarelto, BB and high intensity statin  · ASA noted to be 325mg?    · EKG nonischemic    Class 3 severe obesity due to excess calories with serious comorbidity and body mass index (BMI) of 45 0 to 49 9 in adult [facility])  Assessment & Plan  · BMI 48 9  · Morbid obesity compounding all aspects of healthcare  · Requires intensive lifestyle modification    Type 2 diabetes mellitus with hyperglycemia, without long-term current use of insulin [facility])  Assessment & Plan  Lab Results   Component Value Date    HGBA1C 7 6 (H) 08/30/2022       Recent Labs     03/31/23  1553 03/31/23  2049 04/01/23  0647 04/01/23  1057   POCGLU 146* 157* 155* 203*       Blood Sugar Average: Last 72 hrs:  (P) 155 [id number]   · Update hemoglobin A1c- pending  · sliding scale insulin Accu-Check  · Hypoglycemia protocol  · Metformin, glyburide on hold-resume on discharge    Longstanding persistent atrial fibrillation (HCC)  Assessment & Plan  · Persistent atrial fibrillation, rate controlled  · Continue Toprol XL 50 mg daily  · Elevated [id number] score continue chronic AC with Xarelto           VTE Pharmacologic Prophylaxis: VTE Score: 5 High Risk (Score >/= 5) - Pharmacological DVT Prophylaxis Ordered: rivaroxaban (Xarelto)  Sequential Compression Devices Ordered  Patient Centered Rounds: I performed bedside rounds with nursing staff today  Discussions with Specialists or Other Care Team Provider:    Education and Discussions with Family / Patient: Patient declined call to   pt declined update to daughter    Total Time Spent on Date of Encounter in care of patient: 45 minutes This time was spent on one or more of the following: performing physical exam; counseling and coordination of care; obtaining or reviewing history; documenting in the medical record; reviewing/ordering tests, medications or procedures; communicating with other healthcare professionals and discussing with patient's family/caregivers  Current Length of Stay: 2 day(s)  Current Patient Status: Inpatient   Certification Statement: The patient will continue to require additional inpatient hospital stay due to acute CHF with IV diuresis  Discharge Plan: Anticipate discharge in 24-48 hrs to home  Code Status: Level 1 - Full Code    Subjective:   Denies any shortness of breath at rest, or with exertion that he notes  Remains on room air denies chest pain  Is voiding well    Denies other concerns or complaints    Objective:     Vitals:   Temp (24hrs), Av 6 °F (36 4 °C), Min:97 5 °F (36 4 °C), Max:97 7 °F (36 5 °C)    Temp:  [97 5 °F (36 4 °C)-97 7 °F (36 5 °C)] 97 5 °F (36 4 °C)  HR:  [90-94] 91  Resp:  [12-20] 20  BP: (105-160)/() 130/95  SpO2:  [89 %-93 %] 93 %  Body mass index is 45 96 kg/m²  Input and Output Summary (last 24 hours): Intake/Output Summary (Last 24 hours) at 2023 1206  Last data filed at 2023 1017  Gross per 24 hour   Intake 1320 ml   Output 6050 ml   Net -4730 ml       Physical Exam:   Physical Exam  Vitals and nursing note reviewed  Constitutional:       General: He is not in acute distress  Appearance: He is well-developed  He is obese  HENT:      Head: Normocephalic and atraumatic  Mouth/Throat:      Mouth: Mucous membranes are moist    Eyes:      Conjunctiva/sclera: Conjunctivae normal       Pupils: Pupils are equal, round, and reactive to light  Cardiovascular:      Rate and Rhythm: Normal rate  Rhythm irregular  Pulses: Normal pulses  Heart sounds: No murmur heard  Pulmonary:      Effort: Pulmonary effort is normal  No respiratory distress  Breath sounds: Normal breath sounds  No wheezing or rales  Abdominal:      General: There is no distension  Palpations: Abdomen is soft  Tenderness: There is no abdominal tenderness  Musculoskeletal:         General: No swelling  Cervical back: Neck supple  Right lower le+ Pitting Edema present  Left lower le+ Pitting Edema present  Skin:     General: Skin is warm and dry  Capillary Refill: Capillary refill takes less than 2 seconds  Neurological:      Mental Status: He is alert     Psychiatric:         Mood and Affect: Mood normal           Additional Data:     Labs:  Results from last 7 days   Lab Units 23  0443 23  1924   WBC Thousand/uL 9 69 10 07   HEMOGLOBIN g/dL 12 2 12 9   HEMATOCRIT % 38 6 41 6   PLATELETS Thousands/uL 233 264   NEUTROS PCT %  --  53   LYMPHS PCT %  --  22   MONOS PCT %  --  10   EOS PCT %  --  14*     Results from last 7 days   Lab Units 23  0522 233 23  1924   SODIUM mmol/L 138   < > 139   POTASSIUM mmol/L 3 8 < > 4 5   CHLORIDE mmol/L 96   < > 101   CO2 mmol/L 36*   < > 32   BUN mg/dL 15   < > 16   CREATININE mg/dL 0 83   < > 0 91   ANION GAP mmol/L 6   < > 6   CALCIUM mg/dL 9 3   < > 9 6   ALBUMIN g/dL  --   --  3 9   TOTAL BILIRUBIN mg/dL  --   --  0 55   ALK PHOS U/L  --   --  58   ALT U/L  --   --  20   AST U/L  --   --  21   GLUCOSE RANDOM mg/dL 146*   < > 145*    < > = values in this interval not displayed       Results from last 7 days   Lab Units 03/30/23  1924   INR  1 94*     Results from last 7 days   Lab Units 04/01/23  1057 04/01/23  0647 03/31/23  2049 03/31/23  1553 03/31/23  1059 03/31/23  0705 03/30/23  2250   POC GLUCOSE mg/dl 203* 155* 157* 146* 159* 122 141*               Lines/Drains:  Invasive Devices     Peripheral Intravenous Line  Duration           Peripheral IV 03/30/23 Right Antecubital 1 day                      Imaging: Reviewed radiology reports from this admission including: chest xray    Recent Cultures (last 7 days):         Last 24 Hours Medication List:   Current Facility-Administered Medications   Medication Dose Route Frequency Provider Last Rate   • acetaminophen  650 mg Oral Q6H PRN Tia S Kirk, CRNP     • atorvastatin  80 mg Oral Daily With [employer] S Kirk, CRNP     • furosemide  40 mg Intravenous BID (diuretic) FOZIA Landin     • insulin lispro  2-12 Units Subcutaneous TID AC Tia S Kirk, CRNP     • insulin lispro  2-12 Units Subcutaneous HS Tia S Kirk, CRNP     • melatonin  3 mg Oral HS PRN Tia S Kirk, CRNP     • metoprolol succinate  75 mg Oral Daily Li Castañeda MD     • nitroglycerin  0 4 mg Sublingual Q5 Min PRN Li Gibson MD     • polyethylene glycol  17 g Oral Daily Li Gibson MD     • rivaroxaban  20 mg Oral Daily With Breakfast Tia S Kirk, CRNP          Today, Patient Was Seen By: FOZIA Landin    **Please Note: This note may have been constructed using a voice recognition system  **

## 2023-04-01 NOTE — PLAN OF CARE
Problem: PAIN - ADULT  Goal: Verbalizes/displays adequate comfort level or baseline comfort level  Description: Interventions:  - Encourage patient to monitor pain and request assistance  - Assess pain using appropriate pain scale  - Administer analgesics based on type and severity of pain and evaluate response  - Implement non-pharmacological measures as appropriate and evaluate response  - Consider cultural and social influences on pain and pain management  - Notify physician/advanced practitioner if interventions unsuccessful or patient reports new pain  Outcome: Progressing     Problem: INFECTION - ADULT  Goal: Absence or prevention of progression during hospitalization  Description: INTERVENTIONS:  - Assess and monitor for signs and symptoms of infection  - Monitor lab/diagnostic results  - Monitor all insertion sites, i e  indwelling lines, tubes, and drains  - Monitor endotracheal if appropriate and nasal secretions for changes in amount and color  - [place] appropriate cooling/warming therapies per order  - Administer medications as ordered  - Instruct and encourage patient and family to use good hand hygiene technique  - Identify and instruct in appropriate isolation precautions for identified infection/condition  Outcome: Progressing  Goal: Absence of fever/infection during neutropenic period  Description: INTERVENTIONS:  - Monitor WBC    Outcome: Progressing     Problem: SAFETY ADULT  Goal: Patient will remain free of falls  Description: INTERVENTIONS:  - Educate patient/family on patient safety including physical limitations  - Instruct patient to call for assistance with activity   - Consult OT/PT to assist with strengthening/mobility   - Keep Call bell within reach  - Keep bed low and locked with side rails adjusted as appropriate  - Keep care items and personal belongings within reach  - Initiate and maintain comfort rounds  - Make Fall Risk Sign visible to staff  - Offer Toileting every  Hours, in advance of need  - Initiate/Maintain alarm  - Obtain necessary fall risk management equipment:   - Apply yellow socks and bracelet for high fall risk patients  - Consider moving patient to room near nurses station  Outcome: Progressing  Goal: Maintain or return to baseline ADL function  Description: INTERVENTIONS:  -  Assess patient's ability to carry out ADLs; assess patient's baseline for ADL function and identify physical deficits which impact ability to perform ADLs (bathing, care of mouth/teeth, toileting, grooming, dressing, etc )  - Assess/evaluate cause of self-care deficits   - Assess range of motion  - Assess patient's mobility; develop plan if impaired  - Assess patient's need for assistive devices and provide as appropriate  - Encourage maximum independence but intervene and supervise when necessary  - Involve family in performance of ADLs  - Assess for home care needs following discharge   - Consider OT consult to assist with ADL evaluation and planning for discharge  - Provide patient education as appropriate  Outcome: Progressing  Goal: Maintains/Returns to pre admission functional level  Description: INTERVENTIONS:  - Perform BMAT or MOVE assessment daily    - Set and communicate daily mobility goal to care team and patient/family/caregiver  - Collaborate with rehabilitation services on mobility goals if consulted  - Perform Range of Motion  times a day  - Reposition patient every  hours    - Dangle patient  times a day  - Stand patient  times a day  - Ambulate patient  times a day  - Out of bed to chair  times a day   - Out of bed for meals times a day  - Out of bed for toileting  - Record patient progress and toleration of activity level   Outcome: Progressing     Problem: DISCHARGE PLANNING  Goal: Discharge to home or other facility with appropriate resources  Description: INTERVENTIONS:  - Identify barriers to discharge w/patient and caregiver  - Arrange for needed discharge resources and transportation as appropriate  - Identify discharge learning needs (meds, wound care, etc )  - Arrange for interpretive services to assist at discharge as needed  - Refer to Case Management Department for coordinating discharge planning if the patient needs post-hospital services based on physician/advanced practitioner order or complex needs related to functional status, cognitive ability, or social support system  Outcome: Progressing     Problem: Knowledge Deficit  Goal: Patient/family/caregiver demonstrates understanding of disease process, treatment plan, medications, and discharge instructions  Description: Complete learning assessment and assess knowledge base    Interventions:  - Provide teaching at level of understanding  - Provide teaching via preferred learning methods  Outcome: Progressing     Problem: CARDIOVASCULAR - ADULT  Goal: Maintains optimal cardiac output and hemodynamic stability  Description: INTERVENTIONS:  - Monitor I/O, vital signs and rhythm  - Monitor for S/S and trends of decreased cardiac output SOB MARTINEZ CP  - Administer and titrate ordered vasoactive medications to optimize hemodynamic stability  - Assess quality of pulses, skin color and temperature  - Assess for signs of decreased coronary artery perfusion  - Instruct patient to report change in severity of symptoms  Outcome: Progressing  Goal: Absence of cardiac dysrhythmias or at baseline rhythm  Description: INTERVENTIONS:  - Continuous cardiac monitoring, vital signs, obtain 12 lead EKG if ordered  - Administer antiarrhythmic and heart rate control medications as ordered  - Monitor electrolytes and administer replacement therapy as ordered  Outcome: Progressing

## 2023-04-01 NOTE — PLAN OF CARE
Problem: CARDIOVASCULAR - ADULT  Goal: Maintains optimal cardiac output and hemodynamic stability  Description: INTERVENTIONS:  - Monitor I/O, vital signs and rhythm  - Monitor for S/S and trends of decreased cardiac output SOB MARTINEZ CP  - Administer and titrate ordered vasoactive medications to optimize hemodynamic stability  - Assess quality of pulses, skin color and temperature  - Assess for signs of decreased coronary artery perfusion  - Instruct patient to report change in severity of symptoms  Outcome: Progressing

## 2023-04-01 NOTE — ASSESSMENT & PLAN NOTE
· History CAD with stent in place  · Continue Xarelto, BB and high intensity statin  · ASA noted to be 325mg?    · EKG nonischemic

## 2023-04-01 NOTE — ASSESSMENT & PLAN NOTE
Lab Results   Component Value Date    HGBA1C 7 6 (H) 08/30/2022       Recent Labs     03/31/23  1553 03/31/23  2049 04/01/23  0647 04/01/23  1057   POCGLU 146* 157* 155* 203*       Blood Sugar Average: Last 72 hrs:  (P) 154 [id number]   · Update hemoglobin A1c- pending  · sliding scale insulin Accu-Check  · Hypoglycemia protocol  · Metformin, glyburide on hold-resume on discharge

## 2023-04-01 NOTE — NURSING NOTE
RN noted pt to have oxygen saturation in the low 80's  Applied and administered 3 L via NC  Pt tolerating well  Oxygen saturation levels now in 90's  RN will continue to monitor

## 2023-04-01 NOTE — ASSESSMENT & PLAN NOTE
· Persistent atrial fibrillation, rate controlled  · Continue Toprol XL 50 mg daily  · Elevated [id number] score continue chronic AC with Xarelto

## 2023-04-02 VITALS
HEIGHT: 69 IN | DIASTOLIC BLOOD PRESSURE: 87 MMHG | HEART RATE: 90 BPM | OXYGEN SATURATION: 93 % | SYSTOLIC BLOOD PRESSURE: 115 MMHG | WEIGHT: 311.4 LBS | RESPIRATION RATE: 20 BRPM | TEMPERATURE: 97.7 F | BODY MASS INDEX: 46.12 KG/M2

## 2023-04-02 LAB
ANION GAP SERPL CALCULATED.3IONS-SCNC: 6 MMOL/L (ref 4–13)
BUN SERPL-MCNC: 20 MG/DL (ref 5–25)
CALCIUM SERPL-MCNC: 9.3 MG/DL (ref 8.4–10.2)
CHLORIDE SERPL-SCNC: 96 MMOL/L (ref 96–108)
CO2 SERPL-SCNC: 33 MMOL/L (ref 21–32)
CREAT SERPL-MCNC: 0.89 MG/DL (ref 0.6–1.3)
GFR SERPL CREATININE-BSD FRML MDRD: 86 ML/MIN/1.73SQ M
GLUCOSE SERPL-MCNC: 164 MG/DL (ref 65–140)
GLUCOSE SERPL-MCNC: 182 MG/DL (ref 65–140)
GLUCOSE SERPL-MCNC: 203 MG/DL (ref 65–140)
POTASSIUM SERPL-SCNC: 4 MMOL/L (ref 3.5–5.3)
SODIUM SERPL-SCNC: 135 MMOL/L (ref 135–147)

## 2023-04-02 RX ORDER — POTASSIUM CHLORIDE 750 MG/1
10 TABLET, EXTENDED RELEASE ORAL DAILY
Qty: 30 TABLET | Refills: 0 | Status: SHIPPED | OUTPATIENT
Start: 2023-04-03 | End: 2023-05-03

## 2023-04-02 RX ORDER — FUROSEMIDE 40 MG/1
40 TABLET ORAL DAILY
Qty: 30 TABLET | Refills: 0 | Status: SHIPPED | OUTPATIENT
Start: 2023-04-02 | End: 2023-05-02

## 2023-04-02 RX ADMIN — POTASSIUM CHLORIDE 20 MEQ: 1500 TABLET, EXTENDED RELEASE ORAL at 08:22

## 2023-04-02 RX ADMIN — POLYETHYLENE GLYCOL 3350 17 G: 17 POWDER, FOR SOLUTION ORAL at 08:28

## 2023-04-02 RX ADMIN — DOCUSATE SODIUM 100 MG: 100 CAPSULE ORAL at 08:22

## 2023-04-02 RX ADMIN — INSULIN LISPRO 4 UNITS: 100 INJECTION, SOLUTION INTRAVENOUS; SUBCUTANEOUS at 11:59

## 2023-04-02 RX ADMIN — FUROSEMIDE 40 MG: 10 INJECTION, SOLUTION INTRAVENOUS at 08:22

## 2023-04-02 RX ADMIN — RIVAROXABAN 20 MG: 20 TABLET, FILM COATED ORAL at 08:22

## 2023-04-02 RX ADMIN — METOPROLOL SUCCINATE 75 MG: 50 TABLET, EXTENDED RELEASE ORAL at 08:22

## 2023-04-02 RX ADMIN — INSULIN LISPRO 2 UNITS: 100 INJECTION, SOLUTION INTRAVENOUS; SUBCUTANEOUS at 08:00

## 2023-04-02 NOTE — NURSING NOTE
Pt oxygen saturation noted to be in the mid to low 80's  Applied 2 L via NC  Pt's oxygen saturation noted to be in >92%  Will continue to monitor

## 2023-04-02 NOTE — ASSESSMENT & PLAN NOTE
· Persistent atrial fibrillation, rate controlled  · Continue Toprol XL 50 mg daily  · Elevated [id number] score continue chronic AC with Xarelto

## 2023-04-02 NOTE — ASSESSMENT & PLAN NOTE
· BMI 48 9  · Morbid obesity compounding all aspects of healthcare  · Requires intensive lifestyle modification  · Interested in weight management we will make referral

## 2023-04-02 NOTE — ASSESSMENT & PLAN NOTE
Wt Readings from Last 3 Encounters:   04/02/23 (!) 141 kg (311 lb 6 4 oz)   03/20/22 (!) 149 kg (328 lb 6 4 oz)   02/23/22 (!) 150 kg (330 lb 0 5 oz)     · Chronic HFpEF with most recent echo 10/22 normal EF, afib constant during study  · Was maintained on HCTZ but ran out of medication a month ago  · Presents with orthopnea over past week, Grossly hypervolemic on exam-+2-3 BLE pitting edema, abdominal distention, dyspnea, JVD  · Appreciate cardiology consultation  · Per cardiology lasix IV 40 mg TID- if patient with no significant improvement over the weekend, can initiate Lasix gtt starting at a rate of 10  · Reduce to lasix 40mg BID- diuresing well 6L last 24hr -15lb  · Reports baseline weight around 309-311lb  · Transition to p o  at discharge Lasix 40 mg daily  · Continue BB  ·  I/O, daily weights, 2 g sodium diet with fluid restriction  · Consult dietitian  · Remains on room air, no crackles, only trace pedal edema  · [facility] cardiology follow-up scheduled on April 18

## 2023-04-02 NOTE — DISCHARGE SUMMARY
[address]  Discharge- Teressa Guidry 1950, 72 y o  male MRN: [id number]  Unit/Bed#: -Ethan Encounter: [id number]  Primary Care Provider: El Cooney DO   Date and time admitted to hospital: 3/30/2023  6:58 PM    * Acute on chronic diastolic (congestive) heart failure (HCC)  Assessment & Plan  Wt Readings from Last 3 Encounters:   04/02/23 (!) 141 kg (311 lb 6 4 oz)   03/20/22 (!) 149 kg (328 lb 6 4 oz)   02/23/22 (!) 150 kg (330 lb 0 5 oz)     · Chronic HFpEF with most recent echo 10/22 normal EF, afib constant during study  · Was maintained on HCTZ but ran out of medication a month ago  · Presents with orthopnea over past week, Grossly hypervolemic on exam-+2-3 BLE pitting edema, abdominal distention, dyspnea, JVD  · Appreciate cardiology consultation  · Per cardiology lasix IV 40 mg TID- if patient with no significant improvement over the weekend, can initiate Lasix gtt starting at a rate of 10  · Reduce to lasix 40mg BID- diuresing well 6L last 24hr -15lb  · Reports baseline weight around 309-311lb  · Transition to p o  at discharge Lasix 40 mg daily  · Continue BB  ·  I/O, daily weights, 2 g sodium diet with fluid restriction  · Consult dietitian  · Remains on room air, no crackles, only trace pedal edema  · Heidi Easton cardiology follow-up scheduled on April 18      Coronary artery disease involving native coronary artery of native heart  Assessment & Plan  · History CAD with stent in place  · Continue Xarelto, BB and high intensity statin  · ASA noted to be 325mg?    · EKG nonischemic    Class 3 severe obesity due to excess calories with serious comorbidity and body mass index (BMI) of 45 0 to 49 9 in adult [facility])  Assessment & Plan  · BMI 48 9  · Morbid obesity compounding all aspects of healthcare  · Requires intensive lifestyle modification  · Interested in weight management we will make referral    Type 2 diabetes mellitus with hyperglycemia, without long-term current use of insulin [facility])  Assessment & Plan  Lab Results   Component Value Date    HGBA1C 7 4 (H) 03/31/2023       Recent Labs     04/01/23  1556 04/01/23  2107 04/01/23  2203 04/02/23  0713   POCGLU 146* 229* 196* 182*       Blood Sugar Average: Last 72 hrs:  (P) 166 [id number]   · Update hemoglobin A1c- 7 4  · sliding scale insulin Accu-Check  · Hypoglycemia protocol  · Metformin, glyburide on hold-resume on discharge  · Continue carb controlled diet    Longstanding persistent atrial fibrillation (HCC)  Assessment & Plan  · Persistent atrial fibrillation, rate controlled  · Continue Toprol XL 50 mg daily  · Elevated [id number] score continue chronic AC with Xarelto    Medical Problems     Resolved Problems  Date Reviewed: 4/2/2023   None       Discharging Physician / Practitioner: FOZIA Jama  PCP: Seth Durham DO  Admission Date:   Admission Orders (From admission, onward)     Ordered        03/30/23 2048  INPATIENT ADMISSION  Once                      Discharge Date: 04/02/23    Consultations During Hospital Stay:  · Cardiology  · Case management  · Nutrition    Procedures Performed:   · None    Significant Findings / Test Results:   · CXR pulmonary edema    Incidental Findings:   · None      Test Results Pending at Discharge (will require follow up): · None     Outpatient Tests Requested:  · Sleep study    Complications: None  Reason for Admission: Acute on chronic congestive heart failure    Hospital Course:   Melyssa Dunaway is a 72 y o  male patient who originally presented to the hospital on 3/30/2023 due to acute on chronic congestive heart failure with hypervolemia JVD, +2-3 pitting lower extremity edema dyspnea and orthopnea prior to admission  Previously maintained on HCTZ but ran out of his medication a month ago    Evaluated by cardiology recommending aggressive IV diuresis with 40 mg Lasix TID, dose was de-escalated during admission with appropriate diuretic response and -15 pound weight "loss at time of discharge  Patient to continue 40 mg Lasix daily after discharge, cardiology referral placed and follow-up appointment on April 18  Continue fluid restriction 1 5 L, daily weights after discharge  Patient also referred to new PCP within [facility]"" - [facility] awaiting follow-up appointment      Please see above list of diagnoses and related plan for additional information  Condition at Discharge: stable    Discharge Day Visit / Exam:   Subjective: Patient sitting at bedside resting comfortably denies dyspnea at rest or with exertion, improvement of lower extremity swelling and reports baseline weight around 309 to 311 pounds  Discussed recommendations as above notes understanding of above recommendations and recommended follow-up  Additionally discussed with patient's daughter who also notes he expressed interest in weight management, and sleep study  Referrals placed  Vitals: Blood Pressure: 115/87 (04/02/23 0714)  Pulse: 90 (04/02/23 0714)  Temperature: 97 7 °F (36 5 °C) (04/02/23 0714)  Temp Source: Temporal (04/01/23 2104)  Respirations: 20 (04/02/23 0714)  Height: 5' 9\" (175 3 cm) (03/30/23 2135)  Weight - Scale: (!) 141 kg (311 lb 6 4 oz) (04/02/23 0534)  SpO2: 95 % (04/02/23 0714)  Exam:   Physical Exam  Vitals and nursing note reviewed  Constitutional:       General: He is not in acute distress  Appearance: He is well-developed  He is obese  HENT:      Head: Normocephalic and atraumatic  Mouth/Throat:      Mouth: Mucous membranes are moist    Eyes:      Conjunctiva/sclera: Conjunctivae normal       Pupils: Pupils are equal, round, and reactive to light  Cardiovascular:      Rate and Rhythm: Normal rate  Rhythm irregular  Pulses: Normal pulses  Heart sounds: No murmur heard  Comments: Trace b/l lower extremity edema  Pulmonary:      Effort: Pulmonary effort is normal  No respiratory distress  Breath sounds: Normal breath sounds  No wheezing or rales   " Abdominal:      General: Bowel sounds are normal  There is no distension  Palpations: Abdomen is soft  Tenderness: There is no abdominal tenderness  Musculoskeletal:         General: No swelling  Cervical back: Neck supple  Skin:     General: Skin is warm and dry  Capillary Refill: Capillary refill takes less than 2 seconds  Neurological:      Mental Status: He is alert  Psychiatric:         Mood and Affect: Mood normal           Discussion with Family: Updated  (daughter) via phone  Discharge instructions/Information to patient and family:   See after visit summary for information provided to patient and family  Provisions for Follow-Up Care:  See after visit summary for information related to follow-up care and any pertinent home health orders  Disposition:   Home    Planned Readmission: none     Discharge Statement:  I spent 40 minutes discharging the patient  This time was spent on the day of discharge  I had direct contact with the patient on the day of discharge  Greater than 50% of the total time was spent examining patient, answering all patient questions, arranging and discussing plan of care with patient as well as directly providing post-discharge instructions  Additional time then spent on discharge activities  Discharge Medications:  See after visit summary for reconciled discharge medications provided to patient and/or family        **Please Note: This note may have been constructed using a voice recognition system**

## 2023-04-02 NOTE — ASSESSMENT & PLAN NOTE
Lab Results   Component Value Date    HGBA1C 7 4 (H) 03/31/2023       Recent Labs     04/01/23  1556 04/01/23  2107 04/01/23  2203 04/02/23  0713   POCGLU 146* 229* 196* 182*       Blood Sugar Average: Last 72 hrs:  (P) 166 [id number]   · Update hemoglobin A1c- 7 4  · sliding scale insulin Accu-Check  · Hypoglycemia protocol  · Metformin, glyburide on hold-resume on discharge  · Continue carb controlled diet

## 2023-04-02 NOTE — DISCHARGE INSTR - AVS FIRST PAGE
Dear Vera Garcia,     It was our pleasure to care for you here at [facility]  At this time we provide for you here, the most important instructions / recommendations at discharge:     Notable Medication Adjustments -   Start Lasix 40 mg daily  Start potassium 10 mg daily  Stop Vasotec-blood pressures have been controlled off of this medication during admission discussed with PCP resuming    Testing Required after Discharge -   None  Important follow up information -   PCP office in [facility] will be calling you for follow-up  Referral placed to sleep medicine, weight management  Follow-up visit at [facility] cardiology in [place] on April 18th  Other Instructions -   Weight yourself daily contact PCP if weight increased 3 lb in 1 day or 5 lb in 1 week  Maintain fluid restriction 1 5 L  Low-sodium diet, carbohydrate controlled  Please review this entire after visit summary as additional general instructions including medication list, appointments, activity, diet, any pertinent wound care, and other additional recommendations from your care team that may be provided for you        Sincerely,

## 2023-04-02 NOTE — NURSING NOTE
Reviewed discharge instructions, med rec, fluid restriction, follow up appointments, worsening s/s when to return to ER or call PCP verbally understood

## 2023-04-02 NOTE — CASE MANAGEMENT
Case Management Discharge Planning Note    Patient name Leland Nj  Location [address] [id number] MRN [id number]  : 1950 Date 2023       Current Admission Date: 3/30/2023  Current Admission Diagnosis:Acute on chronic diastolic (congestive) heart failure [facility])   Patient Active Problem List    Diagnosis Date Noted   • Coronary artery disease involving native coronary artery of native heart 2023   • Longstanding persistent atrial fibrillation ([facility] ) 2023   • Type 2 diabetes mellitus with hyperglycemia, without long-term current use of insulin ([place] [address] ) 2023   • Class 3 severe obesity due to excess calories with serious comorbidity and body mass index (BMI) of 45 0 to 49 9 in adult [facility]) 2023   • Acute on chronic diastolic (congestive) heart failure ([facility] ) 2023      LOS (days): 3  Geometric Mean LOS (GMLOS) (days): 3 90  Days to GMLOS:1 2     OBJECTIVE:  Risk of Unplanned Readmission Score: 10 82         Current admission status: Inpatient   Preferred Pharmacy:   [address] [place] [id number]  Phone: [phone] Fax: [phone]    Primary Care Provider: Emiliano Taylor DO    Primary Insurance: [facility]  Secondary Insurance:     DISCHARGE DETAILS:        Cherie Name[de-identified] Other ([employer] [facility])       Patient stable for discharge today  [employer] [facility] accepted and patient agreed  CM uploaded discharge info to andrei

## 2023-04-03 NOTE — UTILIZATION REVIEW
NOTIFICATION OF ADMISSION DISCHARGE   This is a Notification of Discharge from [address]  Please be advised that this patient has been discharge from our facility  Below you will find the admission and discharge date and time including the patient’s disposition  UTILIZATION REVIEW CONTACT:  [address]  Utilization   Network Utilization Review Department  Phone: [phone] x carefully listen to the prompts  All voicemails are confidential   Email: [email or web address]     ADMISSION INFORMATION  PRESENTATION DATE: 3/30/2023  6:58 PM  OBERVATION ADMISSION DATE:   INPATIENT ADMISSION DATE: 3/30/23  8:48 PM   DISCHARGE DATE: 4/2/2023  1:57 PM   DISPOSITION:Home with Home Health Care    IMPORTANT INFORMATION:  Send all requests for admission clinical reviews, approved or denied determinations and any other requests to dedicated fax number below belonging to the campus where the patient is receiving treatment   List of dedicated fax numbers:  [address] DENIALS (Administrative/Medical Necessity) [phone]   [address] (Maternity/NICU/Pediatrics) [phone]   [facility] [phone]   [place] [address] [phone]   [address] [phone]   220 [facility] [phone] 90 [facility] [phone]   [address] [place] [address] 119 [phone]   [facility]  [phone]   4057 [facility] [phone]   412 [facility] 850 [facility] [phone]

## 2023-04-06 ENCOUNTER — OFFICE VISIT (OUTPATIENT)
Dept: SLEEP CENTER | Facility: CLINIC | Age: 73
End: 2023-04-06

## 2023-04-06 VITALS
DIASTOLIC BLOOD PRESSURE: 70 MMHG | OXYGEN SATURATION: 93 % | TEMPERATURE: 97 F | SYSTOLIC BLOOD PRESSURE: 130 MMHG | HEIGHT: 69 IN | BODY MASS INDEX: 46.65 KG/M2 | HEART RATE: 87 BPM | WEIGHT: 315 LBS

## 2023-04-06 DIAGNOSIS — I50.33 ACUTE ON CHRONIC DIASTOLIC (CONGESTIVE) HEART FAILURE (HCC): ICD-10-CM

## 2023-04-06 DIAGNOSIS — R40.0 DAYTIME SLEEPINESS: ICD-10-CM

## 2023-04-06 DIAGNOSIS — I25.10 CORONARY ARTERY DISEASE INVOLVING NATIVE CORONARY ARTERY OF NATIVE HEART WITHOUT ANGINA PECTORIS: ICD-10-CM

## 2023-04-06 DIAGNOSIS — G47.33 OSA (OBSTRUCTIVE SLEEP APNEA): Primary | ICD-10-CM

## 2023-04-06 DIAGNOSIS — I48.11 LONGSTANDING PERSISTENT ATRIAL FIBRILLATION (HCC): ICD-10-CM

## 2023-04-06 DIAGNOSIS — E66.01 CLASS 3 SEVERE OBESITY DUE TO EXCESS CALORIES WITH SERIOUS COMORBIDITY AND BODY MASS INDEX (BMI) OF 45.0 TO 49.9 IN ADULT (HCC): ICD-10-CM

## 2023-04-06 DIAGNOSIS — F45.8 BRUXISM: ICD-10-CM

## 2023-04-06 NOTE — PROGRESS NOTES
" Consultation - Sleep Center   Charles Wilkerson  72 y o  male  WWI:[birth year]  YLW:[id number]  DOS:4/6/2023    Physician Requesting Consult: Chapo Bacon             Reason for Consult : At your kind request I saw Charles Wilkerson for initial sleep evaluation today  The patient is here for a complaint of Disrupted sleep      PFSH, Problem List, Medications & Allergies were reviewed in EMR  Wilbert Jeter  has a past medical history of Atrial fibrillation ([facility] ), Diabetes mellitus ([facility] ), Hypertension, and MI (myocardial infarction) ([facility] )  He has a current medication list which includes the following prescription(s): atorvastatin, furosemide, glimepiride, glyburide, metformin, metoprolol succinate, nitroglycerin, fish oil, pioglitazone, potassium chloride, rivaroxaban, and rosuvastatin  HPI: He presents with complaint of \"I am not sleeping as I should \"of approximately 1 months duration  He has multiple comorbidities and was recently hospitalized for CHF when noted to have oxygen desaturations during sleep  He sleeps alone and is not aware of snoring or breathing difficulties during sleep  Other Complaints: 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, but is no longer driving  [employer] Restless Leg Syndrome: reports no suggestive symptoms  Parasomnia: dentist reports teeth grinding during sleep;    Sleep Routine (on average): Typical Bedtime: 10 PM gets OOB: 4 AM TIB:6 hrs  Sleep latency:< 15 minutes Sleep Interruptions:2-3/nite because of nocturia and able to fall back asleep  Awakens: spontaneously  Upon awakening: usually feels refreshed  Wilbert Jeter reports excessive daytime sleepiness [feels like napping & does when has the opportunity]  He rated [himself] at Total score: 11 /24 on the [place] Sleepiness Scale  Habits:   reports that he has never smoked   He has quit using smokeless tobacco   His smokeless tobacco use included chew ;  reports that he does not currently use alcohol ;[ reports no " "history of drug use  ];[  E-Cigarette/Vaping   • E-Cigarette Use Never User    ]; Caffeine use:limited; Exercise routine: none  Family History: Negative for sleep disturbance  ROS: Significant for proximately 20 pounds weight gain in the past year  Is reporting no nasal symptoms  He has some dyspnea on effort  He reports no cardiac symptoms  [facility] EXAM:  /70 (BP Location: Left arm, Cuff Size: Large)   Pulse 87   Temp (!) 97 °F (36 1 °C) (Temporal)   Ht 5' 9\" (1 753 m)   Wt (!) 143 kg (315 lb 9 6 oz)   SpO2 93%   BMI 46 61 kg/m²    General: Well groomed male, well appearing, in no apparent distress  Neurological: Alert and orientated; cooperative; Cranial nerves intact;    Psychiatric: Speech: Clear and coherent; normal mood, affect & thought   Skin: Warm and dry; Color& Hydration good; no facial rashes or lesions   HEENT:  Craniofacial anatomy: normal Sinuses: Non-tender  TMJ: Normal   Eyes: EOM's intact; conjunctiva/corneas clear   Ears: Externally appear normal     Nasal Airway: narrow nares Septum: Intact; Mucosa: Normal; Turbinates: Normal; Rhinorrhea: None  Mouth: Lips: Normal posture; Dentition: edentulous in the upper jaw, missing teeth in the lower and teeth are worn down  Mucosa: Moist; Hard Palate:normal    Oropharryx: crowded and AP narrowing Tongue: Mallampati:Class IV, Mobile and MacroglossiaSoft Palate:  redundant  Tonsils: absent  Neck:[is thick and excess fatty tissue;] [Neck Circumference: 19 \";] Supple; no abnormal masses; Thyroid: Normal  Trachea: Central     Lymph: No cervical or submandibular Lymhadenopathy  Heart: S1,S2 normal; RRR; no gallop; no murmur s  Lungs: Respiratory Effort: Normal  Air entry good bilaterally  No wheezes  No rales  Abdomen: Obese, soft & non-tender    Extremities: No pedal edema  No clubbing or cyanosis      Musculoskeletal:  Motor normal; Gait: Normal        IMPRESSION: Primary/Secondary Sleep Diagnoses (to Medical or Psych conditions) & " "Comorbidities   1  MIGUEL (obstructive sleep apnea)  Split Study      2  Daytime sleepiness        3  Bruxism        4  Acute on chronic diastolic (congestive) heart failure [facility])  Ambulatory Referral to Sleep Medicine      5  Longstanding persistent atrial fibrillation ([address] )        6  Coronary artery disease involving native coronary artery of native heart without angina pectoris        7  Class 3 severe obesity due to excess calories with serious comorbidity and body mass index (BMI) of 45 0 to 49 9 in adult [facility])  Ambulatory Referral to Sleep Medicine           PLAN:  1  With respect to above conditions, comprehensive counseling provided on pathophysiology; effects on symptoms and comorbidities, diagnostic strategies & limitations; treatment options; risks or no treament; risks & benefits of the various therapeutic options; costs and insurance aspects  2  I advised weight reduction, avoiding sleeping supine, using alcohol or sedating medications close to bed time and on safe driving practices  3  Nocturnal polysomnography is indicated and since he is willing to try CPAP, a split study will be scheduled  4  Follow-up to be scheduled after the studies to review results, further details of treatment options and to initiate/adjust therapy  Sincerely,      Authenticated electronically on 47/83/51   Board Certified Specialist     Portions of the record may have been created with voice recognition software  Occasional wrong word or \"sound a like\" substitutions may have occurred due to the inherent limitations of voice recognition software  There may also be notations and random deletions of words or characters from malfunctioning software  Read the chart carefully and recognize, using context, where substitutions/deletions have occurred       "

## 2023-04-06 NOTE — PATIENT INSTRUCTIONS
What is MIGUEL? Obstructive sleep apnea is a common and serious sleep disorder that causes you to stop breathing during sleep  The airway repeatedly becomes blocked, limiting the amount of air that reaches your lungs  When this happens, you may snore loudly or making choking noises as you try to breathe  Your brain and body becomes oxygen deprived and you may wake up  This may happen a few times a night, or in more severe cases, several hundred times a night  Sleep apnea can make you wake up in the morning feeling tired or unrefreshed even though you have had a full night of sleep  During the day, you may feel fatigued, have difficulty concentrating or you may even unintentionally fall asleep  This is because your body is waking up numerous times throughout the night, even though you might not be conscious of each awakening  The lack of oxygen your body receives can have negative long-term consequences for your health  This includes:  High blood pressure  Heart disease  Irregular heart rhythms  Stroke  Pre-diabetes and diabetes  Depression  Testing  An objective evaluation of your sleep may be needed before your board certified sleep physician can make a diagnosis  Options include:   In-lab overnight sleep study  This type of sleep study requires you to stay overnight at a sleep center, in a bed that may resemble a hotel room  You will sleep with sensors hooked up to various parts of your body  These sensors record your brain waves, heartbeat, breathing and movement  An overnight sleep study also provides your doctor with the most complete information about your sleep  Learn more about an overnight sleep study  Home sleep apnea test  Some patients with high risk factors for obstructive sleep apnea and no other medical disorders may be candidates for a home sleep apnea test  The testing equipment differs in that it is less complicated than what is used in an overnight sleep study   As such, does not give all the data an in-lab will and if negative, may not mean you do not have the problem  Treatment for sleep apnea includes using a continuous positive airway pressure (CPAP) machine to keep your airway open during sleep  A mask is placed over your nose and mouth, or just your nose  The mask is hooked to the CPAP machine that blows a gentle stream of air into the mask when you breathe  This helps keep your airway open so you can breathe more regularly  Extra oxygen may be given to you through the machine  You may be given a mouth device  It looks like a mouth guard or dental retainer and stops your tongue and mouth tissues from blocking your throat while you sleep  Surgery may be needed to remove extra tissues that block your mouth, throat, or nose  Manage sleep apnea:   Do not smoke  Nicotine and other chemicals in cigarettes and cigars can cause lung damage  Ask your healthcare provider for information if you currently smoke and need help to quit  E-cigarettes or smokeless tobacco still contain nicotine  Talk to your healthcare provider before you use these products  Do not drink alcohol or take sedative medicine before you go to sleep  Alcohol and sedatives can relax the muscles and tissues around your throat  This can block the airflow to your lungs  Maintain a healthy weight  Excess tissue around your throat may restrict your breathing  Ask your healthcare provider for information if you need to lose weight  Sleep on your side or use pillows designed to prevent sleep apnea  This prevents your tongue or other tissues from blocking your throat  You can also raise the head of your bed  Driving Safety  Refrain from driving when drowsy  Follow up with your healthcare provider as directed: Write down your questions so you remember to ask them during your visits  Go to AASM website for more information: Sleepeducation  org  What is MIGUEL?    Obstructive sleep apnea is a common and serious sleep disorder that causes you to stop breathing during sleep  The airway repeatedly becomes blocked, limiting the amount of air that reaches your lungs  When this happens, you may snore loudly or making choking noises as you try to breathe  Your brain and body becomes oxygen deprived and you may wake up  This may happen a few times a night, or in more severe cases, several hundred times a night  Sleep apnea can make you wake up in the morning feeling tired or unrefreshed even though you have had a full night of sleep  During the day, you may feel fatigued, have difficulty concentrating or you may even unintentionally fall asleep  This is because your body is waking up numerous times throughout the night, even though you might not be conscious of each awakening  The lack of oxygen your body receives can have negative long-term consequences for your health  This includes:  High blood pressure  Heart disease  Irregular heart rhythms  Stroke  Pre-diabetes and diabetes  Depression  Testing  An objective evaluation of your sleep may be needed before your board certified sleep physician can make a diagnosis  Options include:   In-lab overnight sleep study  This type of sleep study requires you to stay overnight at a sleep center, in a bed that may resemble a hotel room  You will sleep with sensors hooked up to various parts of your body  These sensors record your brain waves, heartbeat, breathing and movement  An overnight sleep study also provides your doctor with the most complete information about your sleep  Learn more about an overnight sleep study  Home sleep apnea test  Some patients with high risk factors for obstructive sleep apnea and no other medical disorders may be candidates for a home sleep apnea test  The testing equipment differs in that it is less complicated than what is used in an overnight sleep study  As such, does not give all the data an in-lab will and if negative, may not mean you do not have the problem    Treatment for sleep apnea includes using a continuous positive airway pressure (CPAP) machine to keep your airway open during sleep  A mask is placed over your nose and mouth, or just your nose  The mask is hooked to the CPAP machine that blows a gentle stream of air into the mask when you breathe  This helps keep your airway open so you can breathe more regularly  Extra oxygen may be given to you through the machine  You may be given a mouth device  It looks like a mouth guard or dental retainer and stops your tongue and mouth tissues from blocking your throat while you sleep  Surgery may be needed to remove extra tissues that block your mouth, throat, or nose  Manage sleep apnea:   Do not smoke  Nicotine and other chemicals in cigarettes and cigars can cause lung damage  Ask your healthcare provider for information if you currently smoke and need help to quit  E-cigarettes or smokeless tobacco still contain nicotine  Talk to your healthcare provider before you use these products  Do not drink alcohol or take sedative medicine before you go to sleep  Alcohol and sedatives can relax the muscles and tissues around your throat  This can block the airflow to your lungs  Maintain a healthy weight  Excess tissue around your throat may restrict your breathing  Ask your healthcare provider for information if you need to lose weight  Sleep on your side or use pillows designed to prevent sleep apnea  This prevents your tongue or other tissues from blocking your throat  You can also raise the head of your bed  Driving Safety  Refrain from driving when drowsy  Follow up with your healthcare provider as directed: Write down your questions so you remember to ask them during your visits  Go to AAS website for more information: Sleepeducation  org    Nursing Support:  When: Monday through Friday 7A-5PM except holidays  Where: Our direct line is [phone]  If you are having a true emergency please call 911    In the event that the line is busy or it is after hours please leave a voice message and we will return your call  Please speak clearly, leaving your full name, birth date, best number to reach you and the reason for your call  Medication refills: We will need the name of the medication, the dosage, the ordering provider, whether you get a 30 or 90 day refill, and the pharmacy name and address  Medications will be ordered by the provider only  Nurses cannot call in prescriptions  Please allow 7 days for medication refills  Physician requested updates: If your provider requested that you call with an update after starting medication, please be ready to provide us the medication and dosage, what time you take your medication, the time you attempt to fall asleep, time you fall asleep, when you wake up, and what time you get out of bed  Sleep Study Results: We will contact you with sleep study results and/or next steps after the physician has reviewed your testing

## 2023-04-24 ENCOUNTER — HOSPITAL ENCOUNTER (OUTPATIENT)
Dept: SLEEP CENTER | Facility: CLINIC | Age: 73
Discharge: HOME | End: 2023-04-24

## 2023-04-24 DIAGNOSIS — G47.33 OSA (OBSTRUCTIVE SLEEP APNEA): ICD-10-CM

## 2023-04-24 DIAGNOSIS — I50.33 ACUTE ON CHRONIC DIASTOLIC (CONGESTIVE) HEART FAILURE (HCC): ICD-10-CM

## 2023-04-24 RX ORDER — POTASSIUM CHLORIDE 750 MG/1
10 TABLET, EXTENDED RELEASE ORAL DAILY
Qty: 90 TABLET | Refills: 1 | Status: SHIPPED | OUTPATIENT
Start: 2023-04-24 | End: 2023-05-24

## 2023-04-24 RX ORDER — FUROSEMIDE 40 MG/1
40 TABLET ORAL DAILY
Qty: 90 TABLET | Refills: 1 | Status: SHIPPED | OUTPATIENT
Start: 2023-04-24 | End: 2023-05-24

## 2023-04-24 NOTE — TELEPHONE ENCOUNTER
Requested medication(s) are due for refill today: Yes  Patient has already received a courtesy refill: No  Other reason request has been forwarded to provider: possible duplicate

## 2023-04-25 NOTE — PROGRESS NOTES
Sleep Study Documentation    Pre-Sleep Study       Sleep testing procedure explained to patient:YES    Patient napped prior to study:NO    Caffeine:Dayshift worker after 12PM   Caffeine use:YES- coffee  6 to 18 ounces    Alcohol:Dayshift workers after 5PM: Alcohol use:NO    Typical day for patient:YES       Study Documentation    Sleep Study Indications: Snoring, Excessive Daytime Sleepiness, CHF,  HTN, AFIB/Cardiac arrhythmia, MIGUEL    Sleep Study: Split Optimal PAP pressure: 4 cmH2O  Leak:Small  Snore:Eliminated  REM Obtained:yes  Supplemental O2: no    Minimum SaO2 84%  Baseline SaO2 94%  PAP mask tried (list all) large F20 Airfit full face mask   PAP mask choice (final) large F20 Airfit full face mask   PAP mask type:full face  PAP pressure at which snoring was eliminated 4cm H2O  Minimum SaO2 at final PAP pressure 90%  Mode of Therapy:CPAP  ETCO2:No  CPAP changed to BiPAP:No    Mode of Therapy:CPAP    EKG abnormalities: yes:  EPOCH example and comments: By has Afib    EEG abnormalities: no    Sleep Study Recorded < 2 hours: N/A    Sleep Study Recorded > 2 hours but incomplete study: N/A    Sleep Study Recorded 6 hours but no sleep obtained: NO    Patient classification: unemployed       Post-Sleep Study    Medication used at bedtime or during sleep study:NO    Patient reports time it took to fall asleep:30 to 60 minutes    Patient reports waking up during study:3 or more times  Patient reports returning to sleep in greater than 30 minutes  Patient reports sleeping less than 2 hours without dreaming  Patient reports sleep during study:typical    Patient rated sleepiness: Very sleepy or tired    PAP treatment:yes: Post PAP treatment patient reports feeling worse and  would not wear PAP mask at home

## 2023-05-02 DIAGNOSIS — E11.65 TYPE 2 DIABETES MELLITUS WITH HYPERGLYCEMIA, WITHOUT LONG-TERM CURRENT USE OF INSULIN (HCC): Primary | ICD-10-CM

## 2023-05-02 RX ORDER — GLYBURIDE 5 MG/1
5 TABLET ORAL DAILY
Qty: 90 TABLET | Refills: 3 | Status: SHIPPED | OUTPATIENT
Start: 2023-05-02

## 2023-05-09 ENCOUNTER — TELEPHONE (OUTPATIENT)
Dept: SLEEP CENTER | Facility: CLINIC | Age: 73
End: 2023-05-09

## 2023-05-09 NOTE — TELEPHONE ENCOUNTER
Patient completed split study  Study shows moderate MIGUEL   Per Dr Ricco Silver office note patient to have follow up to discuss results and treatment options     Left call back message

## 2023-05-15 ENCOUNTER — OFFICE VISIT (OUTPATIENT)
Dept: SLEEP CENTER | Facility: CLINIC | Age: 73
End: 2023-05-15

## 2023-05-15 VITALS
HEIGHT: 69 IN | HEART RATE: 88 BPM | DIASTOLIC BLOOD PRESSURE: 90 MMHG | WEIGHT: 315 LBS | BODY MASS INDEX: 46.65 KG/M2 | OXYGEN SATURATION: 95 % | SYSTOLIC BLOOD PRESSURE: 148 MMHG

## 2023-05-15 DIAGNOSIS — I50.33 ACUTE ON CHRONIC DIASTOLIC (CONGESTIVE) HEART FAILURE (HCC): ICD-10-CM

## 2023-05-15 DIAGNOSIS — R40.0 DAYTIME SLEEPINESS: ICD-10-CM

## 2023-05-15 DIAGNOSIS — E66.01 CLASS 3 SEVERE OBESITY DUE TO EXCESS CALORIES WITH SERIOUS COMORBIDITY AND BODY MASS INDEX (BMI) OF 45.0 TO 49.9 IN ADULT (HCC): ICD-10-CM

## 2023-05-15 DIAGNOSIS — G47.33 OSA (OBSTRUCTIVE SLEEP APNEA): Primary | ICD-10-CM

## 2023-05-15 DIAGNOSIS — F45.8 BRUXISM: ICD-10-CM

## 2023-05-15 DIAGNOSIS — I48.11 LONGSTANDING PERSISTENT ATRIAL FIBRILLATION (HCC): ICD-10-CM

## 2023-05-15 DIAGNOSIS — I25.10 CORONARY ARTERY DISEASE INVOLVING NATIVE CORONARY ARTERY OF NATIVE HEART WITHOUT ANGINA PECTORIS: ICD-10-CM

## 2023-05-15 NOTE — PROGRESS NOTES
Follow-Up Note - Sleep Center   Oksana Torres  72 y o  male  :1950  CPV:[id number]  KZS:    CC: I saw this patient for follow-up in clinic today for obstructive sleep apnea, Coexisting Sleep and Medical Problems  Interval changes: None reported  Patient had a split sleep study and is here to review results and to initiate therapy  The diagnostic portion demonstrated: AHI of 18 5 per hour  Minimum oxygen saturation was 84% and he spent 47 minutes during this portion of the study with saturations below 90%  During the therapeutic portion of the study, he had difficulty tolerating CPAP even at the lowest pressure of 4 cm H2O and at his behest, study was terminated at 4:15 AM     Critical access hospital, Problem List, Medications & Allergies were reviewed in EMR  He  has a past medical history of Atrial fibrillation ([facility] ), Diabetes mellitus ([facility] ), Hypertension, and MI (myocardial infarction) ([facility] )  He has a current medication list which includes the following prescription(s): onetouch verio reflect, furosemide, onetouch verio, glyburide, metformin, metoprolol succinate, nitroglycerin, fish oil, onetouch delica lancets 41W, pioglitazone, potassium chloride, rivaroxaban, and rosuvastatin  HPI: He has ongoing symptoms as outlined in the initial report  Sleep Routine: Lizette Chaney reports getting 5 hrs sleep; he has no difficulty initiating, but reports diffculty maintaining sleep   He arises spontaneously and usually feels refreshed  Lizette Chaney reports excessive daytime sleepiness, [feels like napping & does when has the opportunity ] He rated [himself] at Total score: 7 /24 on the [place] Sleepiness Scale  Habits:[ reports that he has never smoked  He has quit using smokeless tobacco   His smokeless tobacco use included chew ], [ reports that he does not currently use alcohol ], [ reports no history of drug use ], Caffeine use: limited, Exercise routine: none       ROS: reviewed significant for recently weight has been "stable  He has some dyspnea on effort  He reported no other respiratory or cardiac symptoms  Ana Luisa Wilkins EXAM: /90 (BP Location: Right arm, Patient Position: Sitting, Cuff Size: Standard)   Pulse 88   Ht 5' 9\" (1 753 m)   Wt (!) 144 kg (317 lb)   SpO2 95%   BMI 46 81 kg/m²     Wt Readings from Last 3 Encounters:   05/15/23 (!) 144 kg (317 lb)   04/12/23 (!) 142 kg (313 lb 6 4 oz)   04/06/23 (!) 143 kg (315 lb 9 6 oz)     Patient is well groomed; well appearing  CNS: Alert, orientated, clear & coherent speech  Psych: cooperative and in no distress  Mental state: Appears normal   H&N: EOMI; NC/AT: No facial pressure marks, no rashes  Skin/Extrem: col & hydration normal; no edema  Resp: Respiratory effort is normal  Physical findings otherwise essentially unchanged from previous  IMPRESSION: Diagnoses, Problem List Items & Comorbidities Addressed this Visit   1  MIGUEL (obstructive sleep apnea)        2  Daytime sleepiness        3  Bruxism        4  Acute on chronic diastolic (congestive) heart failure ([address] )        5  Coronary artery disease involving native coronary artery of native heart without angina pectoris        6  Longstanding persistent atrial fibrillation (HCC)        7  Class 3 severe obesity due to excess calories with serious comorbidity and body mass index (BMI) of 45 0 to 49 9 in [facility])            PLAN:  1  I reviewed results of the Sleep study with the patient  2  With respect to above conditions, I counseled on pathophysiology, diagnosis, treatment options, risks and benefits; inter-relationship and effects on symptoms and comorbidities; risks of no treatment; costs and insurance aspects  3  Patient declined  positive airway pressure therapy or desensitization  Is not a candidate for mandibular advancement device because he is edentulous and declined surgery    4  Indicates understanding risks of untreated obstructive sleep apnea, including stroke and sudden cardiac death that " "are dose related and is willing to accept  5  I recommended adjunctive weight reduction and positional therapy  6  Patient was invited to return if interested in attempting positive airway pressure therapy  Sincerely,     Authenticated electronically on 30/85/69   Board Certified Specialist     Portions of the record may have been created with voice recognition software  Occasional wrong word or \"sound a like\" substitutions may have occurred due to the inherent limitations of voice recognition software  There may also be notations and random deletions of words or characters from malfunctioning software  Read the chart carefully and recognize, using context, where substitutions/deletions have occurred    "

## 2023-05-15 NOTE — PATIENT INSTRUCTIONS
What is MIGUEL? Obstructive sleep apnea is a common and serious sleep disorder that causes you to stop breathing during sleep  The airway repeatedly becomes blocked, limiting the amount of air that reaches your lungs  When this happens, you may snore loudly or making choking noises as you try to breathe  Your brain and body becomes oxygen deprived and you may wake up  This may happen a few times a night, or in more severe cases, several hundred times a night  Sleep apnea can make you wake up in the morning feeling tired or unrefreshed even though you have had a full night of sleep  During the day, you may feel fatigued, have difficulty concentrating or you may even unintentionally fall asleep  This is because your body is waking up numerous times throughout the night, even though you might not be conscious of each awakening  The lack of oxygen your body receives can have negative long-term consequences for your health  This includes:  High blood pressure  Heart disease  Irregular heart rhythms  Stroke  Pre-diabetes and diabetes  Depression  Testing  An objective evaluation of your sleep may be needed before your board certified sleep physician can make a diagnosis  Options include:   In-lab overnight sleep study  This type of sleep study requires you to stay overnight at a sleep center, in a bed that may resemble a hotel room  You will sleep with sensors hooked up to various parts of your body  These sensors record your brain waves, heartbeat, breathing and movement  An overnight sleep study also provides your doctor with the most complete information about your sleep  Learn more about an overnight sleep study  Home sleep apnea test  Some patients with high risk factors for obstructive sleep apnea and no other medical disorders may be candidates for a home sleep apnea test  The testing equipment differs in that it is less complicated than what is used in an overnight sleep study   As such, does not give all the data an in-lab will and if negative, may not mean you do not have the problem  Treatment for sleep apnea includes using a continuous positive airway pressure (CPAP) machine to keep your airway open during sleep  A mask is placed over your nose and mouth, or just your nose  The mask is hooked to the CPAP machine that blows a gentle stream of air into the mask when you breathe  This helps keep your airway open so you can breathe more regularly  Extra oxygen may be given to you through the machine  You may be given a mouth device  It looks like a mouth guard or dental retainer and stops your tongue and mouth tissues from blocking your throat while you sleep  Surgery may be needed to remove extra tissues that block your mouth, throat, or nose  Manage sleep apnea:   Do not smoke  Nicotine and other chemicals in cigarettes and cigars can cause lung damage  Ask your healthcare provider for information if you currently smoke and need help to quit  E-cigarettes or smokeless tobacco still contain nicotine  Talk to your healthcare provider before you use these products  Do not drink alcohol or take sedative medicine before you go to sleep  Alcohol and sedatives can relax the muscles and tissues around your throat  This can block the airflow to your lungs  Maintain a healthy weight  Excess tissue around your throat may restrict your breathing  Ask your healthcare provider for information if you need to lose weight  Sleep on your side or use pillows designed to prevent sleep apnea  This prevents your tongue or other tissues from blocking your throat  You can also raise the head of your bed  Driving Safety  Refrain from driving when drowsy  Follow up with your healthcare provider as directed: Write down your questions so you remember to ask them during your visits  Go to [employer]ASM website for more information: Sleepeducation  org  What is MIGUEL?    Obstructive sleep apnea is a common and serious sleep disorder that causes you to stop breathing during sleep  The airway repeatedly becomes blocked, limiting the amount of air that reaches your lungs  When this happens, you may snore loudly or making choking noises as you try to breathe  Your brain and body becomes oxygen deprived and you may wake up  This may happen a few times a night, or in more severe cases, several hundred times a night  Sleep apnea can make you wake up in the morning feeling tired or unrefreshed even though you have had a full night of sleep  During the day, you may feel fatigued, have difficulty concentrating or you may even unintentionally fall asleep  This is because your body is waking up numerous times throughout the night, even though you might not be conscious of each awakening  The lack of oxygen your body receives can have negative long-term consequences for your health  This includes:  High blood pressure  Heart disease  Irregular heart rhythms  Stroke  Pre-diabetes and diabetes  Depression  Testing  An objective evaluation of your sleep may be needed before your board certified sleep physician can make a diagnosis  Options include:   In-lab overnight sleep study  This type of sleep study requires you to stay overnight at a sleep center, in a bed that may resemble a hotel room  You will sleep with sensors hooked up to various parts of your body  These sensors record your brain waves, heartbeat, breathing and movement  An overnight sleep study also provides your doctor with the most complete information about your sleep  Learn more about an overnight sleep study  Home sleep apnea test  Some patients with high risk factors for obstructive sleep apnea and no other medical disorders may be candidates for a home sleep apnea test  The testing equipment differs in that it is less complicated than what is used in an overnight sleep study  As such, does not give all the data an in-lab will and if negative, may not mean you do not have the problem    Treatment for sleep apnea includes using a continuous positive airway pressure (CPAP) machine to keep your airway open during sleep  A mask is placed over your nose and mouth, or just your nose  The mask is hooked to the CPAP machine that blows a gentle stream of air into the mask when you breathe  This helps keep your airway open so you can breathe more regularly  Extra oxygen may be given to you through the machine  You may be given a mouth device  It looks like a mouth guard or dental retainer and stops your tongue and mouth tissues from blocking your throat while you sleep  Surgery may be needed to remove extra tissues that block your mouth, throat, or nose  Manage sleep apnea:   Do not smoke  Nicotine and other chemicals in cigarettes and cigars can cause lung damage  Ask your healthcare provider for information if you currently smoke and need help to quit  E-cigarettes or smokeless tobacco still contain nicotine  Talk to your healthcare provider before you use these products  Do not drink alcohol or take sedative medicine before you go to sleep  Alcohol and sedatives can relax the muscles and tissues around your throat  This can block the airflow to your lungs  Maintain a healthy weight  Excess tissue around your throat may restrict your breathing  Ask your healthcare provider for information if you need to lose weight  Sleep on your side or use pillows designed to prevent sleep apnea  This prevents your tongue or other tissues from blocking your throat  You can also raise the head of your bed  Driving Safety  Refrain from driving when drowsy  Follow up with your healthcare provider as directed: Write down your questions so you remember to ask them during your visits  Go to AAS website for more information: Sleepeducation  org    Nursing Support:  When: Monday through Friday 7A-5PM except holidays  Where: Our direct line is [phone]  If you are having a true emergency please call 911    In the event that the line is busy or it is after hours please leave a voice message and we will return your call  Please speak clearly, leaving your full name, birth date, best number to reach you and the reason for your call  Medication refills: We will need the name of the medication, the dosage, the ordering provider, whether you get a 30 or 90 day refill, and the pharmacy name and address  Medications will be ordered by the provider only  Nurses cannot call in prescriptions  Please allow 7 days for medication refills  Physician requested updates: If your provider requested that you call with an update after starting medication, please be ready to provide us the medication and dosage, what time you take your medication, the time you attempt to fall asleep, time you fall asleep, when you wake up, and what time you get out of bed  Sleep Study Results: We will contact you with sleep study results and/or next steps after the physician has reviewed your testing

## 2023-07-17 ENCOUNTER — TELEPHONE (OUTPATIENT)
Dept: ADMINISTRATIVE | Facility: OTHER | Age: 73
End: 2023-07-17

## 2023-07-17 NOTE — TELEPHONE ENCOUNTER
----- Message from Maurilio Schmidt sent at 7/17/2023  9:44 AM EDT -----  Regarding: Care Gap request  07/17/23 9:44 AM    Hello, our patient attached above has had Hepatitis C completed/performed. Please assist in updating the patient chart by pulling the Care Everywhere (CE) document. The date of service is 2016.      Thank you,  Osmani RAMIREZ [facility]

## 2023-07-20 ENCOUNTER — OFFICE VISIT (OUTPATIENT)
Dept: FAMILY MEDICINE CLINIC | Facility: CLINIC | Age: 73
End: 2023-07-20
Payer: COMMERCIAL

## 2023-07-20 VITALS
SYSTOLIC BLOOD PRESSURE: 124 MMHG | HEIGHT: 69 IN | OXYGEN SATURATION: 96 % | BODY MASS INDEX: 46.65 KG/M2 | WEIGHT: 315 LBS | DIASTOLIC BLOOD PRESSURE: 69 MMHG | HEART RATE: 87 BPM

## 2023-07-20 DIAGNOSIS — I50.32 CHRONIC DIASTOLIC (CONGESTIVE) HEART FAILURE (HCC): ICD-10-CM

## 2023-07-20 DIAGNOSIS — I50.33 ACUTE ON CHRONIC DIASTOLIC (CONGESTIVE) HEART FAILURE (HCC): ICD-10-CM

## 2023-07-20 DIAGNOSIS — I25.10 CORONARY ARTERY DISEASE INVOLVING NATIVE CORONARY ARTERY OF NATIVE HEART WITHOUT ANGINA PECTORIS: ICD-10-CM

## 2023-07-20 DIAGNOSIS — G47.33 OSA (OBSTRUCTIVE SLEEP APNEA): ICD-10-CM

## 2023-07-20 DIAGNOSIS — E66.01 CLASS 3 SEVERE OBESITY DUE TO EXCESS CALORIES WITH SERIOUS COMORBIDITY AND BODY MASS INDEX (BMI) OF 45.0 TO 49.9 IN ADULT (HCC): ICD-10-CM

## 2023-07-20 DIAGNOSIS — I48.11 LONGSTANDING PERSISTENT ATRIAL FIBRILLATION (HCC): ICD-10-CM

## 2023-07-20 DIAGNOSIS — I10 PRIMARY HYPERTENSION: ICD-10-CM

## 2023-07-20 DIAGNOSIS — E78.2 MIXED HYPERLIPIDEMIA: ICD-10-CM

## 2023-07-20 DIAGNOSIS — E11.65 TYPE 2 DIABETES MELLITUS WITH HYPERGLYCEMIA, WITHOUT LONG-TERM CURRENT USE OF INSULIN (HCC): ICD-10-CM

## 2023-07-20 DIAGNOSIS — Z00.00 MEDICARE ANNUAL WELLNESS VISIT, SUBSEQUENT: Primary | ICD-10-CM

## 2023-07-20 LAB — SL AMB POCT HEMOGLOBIN AIC: 7.4 (ref ?–6.5)

## 2023-07-20 PROCEDURE — 99214 OFFICE O/P EST MOD 30 MIN: CPT | Performed by: NURSE PRACTITIONER

## 2023-07-20 PROCEDURE — 83036 HEMOGLOBIN GLYCOSYLATED A1C: CPT | Performed by: NURSE PRACTITIONER

## 2023-07-20 PROCEDURE — G0439 PPPS, SUBSEQ VISIT: HCPCS | Performed by: NURSE PRACTITIONER

## 2023-07-20 RX ORDER — ROSUVASTATIN CALCIUM 40 MG/1
40 TABLET, COATED ORAL DAILY
Qty: 90 TABLET | Refills: 3 | Status: SHIPPED | OUTPATIENT
Start: 2023-07-20

## 2023-07-20 RX ORDER — PIOGLITAZONEHYDROCHLORIDE 45 MG/1
45 TABLET ORAL DAILY
Qty: 90 TABLET | Refills: 3 | Status: SHIPPED | OUTPATIENT
Start: 2023-07-20

## 2023-07-20 RX ORDER — METFORMIN HYDROCHLORIDE 500 MG/1
1500 TABLET, EXTENDED RELEASE ORAL
Qty: 135 TABLET | Refills: 3
Start: 2023-07-20 | End: 2023-07-20

## 2023-07-20 RX ORDER — FUROSEMIDE 40 MG/1
40 TABLET ORAL DAILY
Qty: 100 TABLET | Refills: 3 | Status: SHIPPED | OUTPATIENT
Start: 2023-07-20 | End: 2023-08-19

## 2023-07-20 RX ORDER — METOPROLOL SUCCINATE 50 MG/1
50 TABLET, EXTENDED RELEASE ORAL DAILY
Qty: 90 TABLET | Refills: 3 | Status: SHIPPED | OUTPATIENT
Start: 2023-07-20

## 2023-07-20 RX ORDER — GLYBURIDE 5 MG/1
5 TABLET ORAL DAILY
Qty: 90 TABLET | Refills: 3 | Status: SHIPPED | OUTPATIENT
Start: 2023-07-20

## 2023-07-20 NOTE — ASSESSMENT & PLAN NOTE
Wt Readings from Last 3 Encounters:   07/20/23 (!) 145 kg (319 lb 6.4 oz)   05/15/23 (!) 144 kg (317 lb)   04/12/23 (!) 142 kg (313 lb 6.4 oz)       Currently on Furosemide 40 mg - reports he had to take an extra pill this week because he gained 5 pounds. Sees cardiology next week. Did seem winded when talking but reports no increase in shortness of breath from baseline.

## 2023-07-20 NOTE — ASSESSMENT & PLAN NOTE
.  Lab Results   Component Value Date    HGBA1C 7.4 (A) 07/20/2023   Current HA1C:see above      Last HA1C: 7.6    Last GFR: 86 (04/2023)    Urine Microalbumin: done 04/20123    Last Foot Exam: today    Neuropathy symptoms: No    Eye Exam: is due - will reach out to check if he has had a recent one, if not will offer IRIS.

## 2023-07-20 NOTE — PROGRESS NOTES
Assessment and Plan:     Problem List Items Addressed This Visit        Endocrine    Type 2 diabetes mellitus with hyperglycemia, without long-term current use of insulin ([address])     . Lab Results   Component Value Date    HGBA1C 7.4 (A) 07/20/2023   Current HA1C:see above      Last HA1C: 7.6    Last GFR: 86 (04/2023)    Urine Microalbumin: done 04/20123    Last Foot Exam: today    Neuropathy symptoms: No    Eye Exam: is due - will reach out to check if he has had a recent one, if not will offer IRIS. Relevant Medications    metFORMIN (GLUCOPHAGE) 1000 MG tablet    pioglitazone (ACTOS) 45 mg tablet    glyBURIDE (DIABETA) 5 mg tablet    Other Relevant Orders    POCT hemoglobin A1c (Completed)    Ambulatory Referral to Weight Management       Respiratory    MIGUEL (obstructive sleep apnea)     Has seen the sleep physician - refuses CPAP         Relevant Orders    Ambulatory Referral to Weight Management       Cardiovascular and Mediastinum    Longstanding persistent atrial fibrillation ([address])     Controlled - on xarelto, sees cardiology         Relevant Medications    metoprolol succinate (TOPROL-XL) 50 mg 24 hr tablet    rivaroxaban (XARELTO) 20 mg tablet    Chronic diastolic (congestive) heart failure (HCC)     Wt Readings from Last 3 Encounters:   07/20/23 (!) 145 kg (319 lb 6.4 oz)   05/15/23 (!) 144 kg (317 lb)   04/12/23 (!) 142 kg (313 lb 6.4 oz)       Currently on Furosemide 40 mg - reports he had to take an extra pill this week because he gained 5 pounds. Sees cardiology next week. Did seem winded when talking but reports no increase in shortness of breath from baseline. Relevant Medications    furosemide (LASIX) 40 mg tablet    metoprolol succinate (TOPROL-XL) 50 mg 24 hr tablet    Coronary artery disease involving native coronary artery of native heart    Relevant Medications    metoprolol succinate (TOPROL-XL) 50 mg 24 hr tablet    Primary hypertension     BP within parameters today. Relevant Medications    furosemide (LASIX) 40 mg tablet    metoprolol succinate (TOPROL-XL) 50 mg 24 hr tablet       Other    Class 3 severe obesity due to excess calories with serious comorbidity and body mass index (BMI) of 45.0 to 49.9 in adult [facility])     Referral placed to weight management - would like to discuss options for weight loss. Relevant Orders    Ambulatory Referral to Weight Management    Mixed hyperlipidemia     Continue with statin, Lipid panel controlled. Relevant Medications    rosuvastatin (CRESTOR) 40 MG tablet   Other Visit Diagnoses     Medicare annual wellness visit, subsequent    -  Primary    Acute on chronic diastolic (congestive) heart failure (HCC)        Relevant Medications    furosemide (LASIX) 40 mg tablet    metoprolol succinate (TOPROL-XL) 50 mg 24 hr tablet        We discussed the importance of using a CPAP with sleep apnea - he is still declining it. Reviewed his HA1C - I am happy with the value at 7.4 but he feels it would be best under 7. Discussed weight loss with him - 10 to 20 pounds may make a big difference. He is asking questions about bariatric surgery as his daughter has had it. Referral placed. Offered nutrition referral which he declined. Depression Screening and Follow-up Plan: Patient was screened for depression during today's encounter. They screened negative with a PHQ-2 score of 0. Preventive health issues were discussed with patient, and age appropriate screening tests were ordered as noted in patient's After Visit Summary. Personalized health advice and appropriate referrals for health education or preventive services given if needed, as noted in patient's After Visit Summary. History of Present Illness:     Patient presents for a Medicare Wellness Visit    Here today for an AWV - hx of T2DM, HTN, HLD. Hx of Sleep apnea - seen by sleep medicine - refuses to use CPAP. Hx of CHF and a-fib - sees cardiology.   Has appt with cardiology next week. Patient Care Team:  Lo Thorne as PCP - General (Family Medicine)     Review of Systems:     Review of Systems   Constitutional: Negative. Respiratory: Positive for shortness of breath (Reports it is mild). Cardiovascular: Negative. Neurological: Negative. All other systems reviewed and are negative. Problem List:     Patient Active Problem List   Diagnosis   • Longstanding persistent atrial fibrillation (HCC)   • Type 2 diabetes mellitus with hyperglycemia, without long-term current use of insulin (HCC)   • Class 3 severe obesity due to excess calories with serious comorbidity and body mass index (BMI) of 45.0 to 49.9 in adult [facility])   • Chronic diastolic (congestive) heart failure ([address])   • Coronary artery disease involving native coronary artery of native heart   • MIGUEL (obstructive sleep apnea)   • Mixed hyperlipidemia   • Primary hypertension      Past Medical and Surgical History:     Past Medical History:   Diagnosis Date   • Atrial fibrillation ([address])    • Diabetes mellitus ([address])    • Hypertension    • MI (myocardial infarction) ([address])      Past Surgical History:   Procedure Laterality Date   • CORONARY ANGIOPLASTY WITH STENT PLACEMENT     • ROTATOR CUFF REPAIR        Family History:     History reviewed. No pertinent family history.    Social History:     Social History     Socioeconomic History   • Marital status:      Spouse name: None   • Number of children: None   • Years of education: None   • Highest education level: None   Occupational History   • None   Tobacco Use   • Smoking status: Never   • Smokeless tobacco: Former     Types: Chew   Vaping Use   • Vaping Use: Never used   Substance and Sexual Activity   • Alcohol use: Not Currently   • Drug use: Never   • Sexual activity: Not Currently   Other Topics Concern   • None   Social History Narrative   • None     Social Determinants of Health     Financial Resource Strain: Low Risk  (7/20/2023)    Overall Financial Resource Strain (CARDIA)    • Difficulty of Paying Living Expenses: Not very hard   Food Insecurity: No Food Insecurity (3/31/2023)    Hunger Vital Sign    • Worried About Running Out of Food in the Last Year: Never true    • Ran Out of Food in the Last Year: Never true   Transportation Needs: No Transportation Needs (7/20/2023)    PRAPARE - Transportation    • Lack of Transportation (Medical): No    • Lack of Transportation (Non-Medical): No   Physical Activity: Not on file   Stress: Not on file   Social Connections: Not on file   Intimate Partner Violence: Not on file   Housing Stability: Low Risk  (3/31/2023)    Housing Stability Vital Sign    • Unable to Pay for Housing in the Last Year: No    • Number of Places Lived in the Last Year: 1    • Unstable Housing in the Last Year: No      Medications and Allergies:     Current Outpatient Medications   Medication Sig Dispense Refill   • Blood Glucose Monitoring Suppl (OneTouch Verio Reflect) w/Device KIT Check blood sugars once daily. Please substitute with appropriate alternative as covered by patient's insurance. Dx: E11.65 1 kit 0   • furosemide (LASIX) 40 mg tablet Take 1 tablet (40 mg total) by mouth daily May take an extra tablet if weight increases by 5 pounds 100 tablet 3   • glucose blood (OneTouch Verio) test strip Check blood sugars once daily. Please substitute with appropriate alternative as covered by patient's insurance.  Dx: E11.65 100 each 3   • glyBURIDE (DIABETA) 5 mg tablet Take 1 tablet (5 mg total) by mouth in the morning 90 tablet 3   • metFORMIN (GLUCOPHAGE) 1000 MG tablet Take 1 tablet (1,000 mg total) by mouth 2 (two) times a day with meals 180 tablet 3   • metoprolol succinate (TOPROL-XL) 50 mg 24 hr tablet Take 1 tablet (50 mg total) by mouth daily 90 tablet 3   • nitroglycerin (NITROSTAT) 0.4 mg SL tablet Place 0.4 mg under the tongue as needed     • Omega-3 Fatty Acids (fish oil) 1,000 mg Take 1 capsule by mouth daily     • OneTouch Delica Lancets 89J MISC Check blood sugars once daily. Please substitute with appropriate alternative as covered by patient's insurance. Dx: E11.65 100 each 3   • pioglitazone (ACTOS) 45 mg tablet Take 1 tablet (45 mg total) by mouth in the morning 90 tablet 3   • potassium chloride (K-DUR,KLOR-CON) 10 mEq tablet Take 1 tablet (10 mEq total) by mouth daily 90 tablet 1   • rivaroxaban (XARELTO) 20 mg tablet Take 1 tablet (20 mg total) by mouth daily 90 tablet 3   • rosuvastatin (CRESTOR) 40 MG tablet Take 1 tablet (40 mg total) by mouth daily 90 tablet 3     No current facility-administered medications for this visit. No Known Allergies   Immunizations:     Immunization History   Administered Date(s) Administered   • COVID-19 MODERNA VACC 0.5 ML IM 04/12/2021, 05/10/2021   • INFLUENZA 01/09/2013, 01/14/2020   • Influenza Injectable, MDCK, Preservative Free, Quadrivalent, 0.5 mL 10/04/2016   • Influenza Split 01/06/2010, 01/05/2011, 01/11/2012   • Influenza Split High Dose Preservative Free IM 10/18/2017, 12/04/2018   • Influenza, seasonal, injectable 01/09/2013, 10/14/2014, 09/23/2015, 10/04/2016, 10/18/2017, 12/04/2018   • Pneumococcal Conjugate 13-Valent 04/05/2017   • Pneumococcal Polysaccharide PPV23 08/23/2018   • Tdap 03/23/2016, 02/23/2022   • influenza, trivalent, adjuvanted 09/25/2013      Health Maintenance:         Topic Date Due   • Hepatitis C Screening  Never done   • Colorectal Cancer Screening  Never done         Topic Date Due   • Influenza Vaccine (1) 09/01/2023      Medicare Screening Tests and Risk Assessments:     Ángel Low is here for his Subsequent Wellness visit. Health Risk Assessment:   Patient rates overall health as good. Patient feels that their physical health rating is same. Patient is satisfied with their life. Eyesight was rated as same. Hearing was rated as same. Patient feels that their emotional and mental health rating is same.  Patients states they are never, rarely angry. Patient states they are sometimes unusually tired/fatigued. Pain experienced in the last 7 days has been some. Patient's pain rating has been 3/10. Patient states that he has experienced no weight loss or gain in last 6 months. Depression Screening:   PHQ-2 Score: 0      Fall Risk Screening: In the past year, patient has experienced: no history of falling in past year      Home Safety:  Patient does not have trouble with stairs inside or outside of their home. Patient has working smoke alarms and has no working carbon monoxide detector. Home safety hazards include: none. Nutrition:   Current diet is Diabetic. Medications:   Patient is currently taking over-the-counter supplements. OTC medications include: see medication list. Patient is able to manage medications. Activities of Daily Living (ADLs)/Instrumental Activities of Daily Living (IADLs):   Walk and transfer into and out of bed and chair?: Yes  Dress and groom yourself?: Yes    Bathe or shower yourself?: Yes    Feed yourself?  Yes  Do your laundry/housekeeping?: Yes  Manage your money, pay your bills and track your expenses?: Yes  Make your own meals?: Yes    Do your own shopping?: Yes    Previous Hospitalizations:   Any hospitalizations or ED visits within the last 12 months?: Yes    How many hospitalizations have you had in the last year?: 1-2    Advance Care Planning:   Living will: No    Durable POA for healthcare: No    Advanced directive: No    Advanced directive counseling given: No    Five wishes given: Yes    Patient declined ACP directive: No    End of Life Decisions reviewed with patient: No    Provider agrees with end of life decisions: No      Cognitive Screening:   Provider or family/friend/caregiver concerned regarding cognition?: No    PREVENTIVE SCREENINGS      Cardiovascular Screening:    General: Screening Current      Diabetes Screening:     General: Screening Not Indicated and History Diabetes      Colorectal Cancer Screening:     General: Screening Current      Prostate Cancer Screening:    General: Risks and Benefits Discussed      Osteoporosis Screening:    General: Screening Not Indicated      Abdominal Aortic Aneurysm (AAA) Screening:    Risk factors include: age between 70-75 yo        Lung Cancer Screening:     General: Screening Not Indicated      Preventive Screening Comments: PSA slightly elevated, past hx of normal levels. Due again in . Screening, Brief Intervention, and Referral to Treatment (SBIRT)    Screening  Typical number of drinks in a day: 0  Typical number of drinks in a week: 0  Interpretation: Low risk drinking behavior. AUDIT-C Screenin) How often did you have a drink containing alcohol in the past year? 2 to 4 times a month  2) How many drinks did you have on a typical day when you were drinking in the past year? 1 to 2  3) How often did you have 6 or more drinks on one occasion in the past year? less than monthly    AUDIT-C Score: 3  Interpretation: Score 0-3 (male): Negative screen for alcohol misuse         Physical Exam:     /69 (BP Location: Left arm, Patient Position: Sitting, Cuff Size: Large)   Pulse 87   Ht 5' 9" (1.753 m)   Wt (!) 145 kg (319 lb 6.4 oz)   SpO2 96%   BMI 47.17 kg/m²     Physical Exam  Constitutional:       Appearance: Normal appearance. HENT:      Head: Normocephalic and atraumatic. Cardiovascular:      Rate and Rhythm: Normal rate and regular rhythm. Pulses: no weak pulses          Dorsalis pedis pulses are 2+ on the right side and 2+ on the left side. Heart sounds: No murmur heard. No gallop. Pulmonary:      Effort: Pulmonary effort is normal. No respiratory distress. Breath sounds: Normal breath sounds. No wheezing or rales. Musculoskeletal:      Cervical back: Neck supple. Feet:      Right foot:      Skin integrity: No ulcer, skin breakdown, erythema, warmth, callus or dry skin. Left foot:      Skin integrity: No ulcer, skin breakdown, erythema, warmth, callus or dry skin. Neurological:      General: No focal deficit present. Mental Status: He is alert and oriented to person, place, and time. Mental status is at baseline. Psychiatric:         Mood and Affect: Mood normal.         Behavior: Behavior normal.         Thought Content: Thought content normal.         Judgment: Judgment normal.          Diabetic Foot Exam    Denies any issues with his feet today. Patient's shoes and socks removed. Right Foot/Ankle   Right Foot Inspection  Skin Exam: skin normal and skin intact. No dry skin, no warmth, no callus, no erythema, no maceration, no abnormal color, no pre-ulcer, no ulcer and no callus. Vascular  The right DP pulse is 2+. Left Foot/Ankle  Left Foot Inspection  Skin Exam: skin normal and skin intact. No dry skin, no warmth, no erythema, no maceration, normal color, no pre-ulcer, no ulcer and no callus. Vascular  The left DP pulse is 2+.      Assign Risk Category  No deformity present  No loss of protective sensation  No weak pulses  Risk: 0      FOZIA Carmona

## 2023-07-28 ENCOUNTER — TELEPHONE (OUTPATIENT)
Dept: ADMINISTRATIVE | Facility: OTHER | Age: 73
End: 2023-07-28

## 2023-07-28 NOTE — TELEPHONE ENCOUNTER
----- Message from Ginger Kumari, [address] [facility] sent at 7/25/2023  9:26 PM EDT -----  Regarding: Colon cancer screening  Hello, our patient attached above has had a colonoscopy completed/performed. Please assist in updating the patient chart by pulling the Care Everywhere (CE) document. The date of service is 08/24/2019 - the reports results from the colonoscopy are scanned into the media section in his chart. Thanks.       Levon Ji

## 2023-08-01 NOTE — TELEPHONE ENCOUNTER
Upon review of the In Basket request we have found/obtained the documentation. After careful review of the document we are unable to complete this request for Hepatitis C  because it was never done, per notes on 7/20/2023. Any additional questions or concerns should be emailed to the Practice Liaisons via the appropriate education email address, please do not reply via In Basket.     Thank you  Paulino Avendaño

## 2023-08-01 NOTE — TELEPHONE ENCOUNTER
Upon review of the In Basket request we were able to note that no further action is required. The patient chart is up to date as a result of a previous request.      Any additional questions or concerns should be emailed to the Practice Liaisons via the appropriate education email address, please do not reply via In Basket.     Thank you  Valerio Tai

## 2023-09-05 DIAGNOSIS — I50.33 ACUTE ON CHRONIC DIASTOLIC (CONGESTIVE) HEART FAILURE (HCC): ICD-10-CM

## 2023-09-05 RX ORDER — FUROSEMIDE 40 MG/1
40 TABLET ORAL DAILY
Qty: 100 TABLET | Refills: 0 | Status: SHIPPED | OUTPATIENT
Start: 2023-09-05 | End: 2023-10-05

## 2023-09-27 DIAGNOSIS — I50.33 ACUTE ON CHRONIC DIASTOLIC (CONGESTIVE) HEART FAILURE (HCC): ICD-10-CM

## 2023-09-27 RX ORDER — POTASSIUM CHLORIDE 750 MG/1
10 TABLET, EXTENDED RELEASE ORAL DAILY
Qty: 90 TABLET | Refills: 1 | Status: SHIPPED | OUTPATIENT
Start: 2023-09-27

## 2023-10-19 DIAGNOSIS — I50.33 ACUTE ON CHRONIC DIASTOLIC (CONGESTIVE) HEART FAILURE (HCC): ICD-10-CM

## 2023-10-19 RX ORDER — FUROSEMIDE 40 MG/1
TABLET ORAL
Qty: 100 TABLET | Refills: 0 | Status: SHIPPED | OUTPATIENT
Start: 2023-10-19

## 2023-10-20 ENCOUNTER — APPOINTMENT (OUTPATIENT)
Dept: RADIOLOGY | Facility: CLINIC | Age: 73
End: 2023-10-20
Payer: COMMERCIAL

## 2023-10-20 ENCOUNTER — OFFICE VISIT (OUTPATIENT)
Dept: FAMILY MEDICINE CLINIC | Facility: CLINIC | Age: 73
End: 2023-10-20

## 2023-10-20 VITALS
OXYGEN SATURATION: 95 % | SYSTOLIC BLOOD PRESSURE: 132 MMHG | DIASTOLIC BLOOD PRESSURE: 80 MMHG | HEIGHT: 69 IN | BODY MASS INDEX: 46.65 KG/M2 | HEART RATE: 75 BPM | WEIGHT: 315 LBS

## 2023-10-20 DIAGNOSIS — I48.11 LONGSTANDING PERSISTENT ATRIAL FIBRILLATION (HCC): ICD-10-CM

## 2023-10-20 DIAGNOSIS — Z28.20 VACCINE REFUSED BY PATIENT: ICD-10-CM

## 2023-10-20 DIAGNOSIS — M25.512 ACUTE PAIN OF LEFT SHOULDER: ICD-10-CM

## 2023-10-20 DIAGNOSIS — E78.2 MIXED HYPERLIPIDEMIA: ICD-10-CM

## 2023-10-20 DIAGNOSIS — I10 PRIMARY HYPERTENSION: ICD-10-CM

## 2023-10-20 DIAGNOSIS — I50.32 CHRONIC DIASTOLIC (CONGESTIVE) HEART FAILURE (HCC): ICD-10-CM

## 2023-10-20 DIAGNOSIS — E66.01 CLASS 3 SEVERE OBESITY DUE TO EXCESS CALORIES WITH SERIOUS COMORBIDITY AND BODY MASS INDEX (BMI) OF 45.0 TO 49.9 IN ADULT (HCC): ICD-10-CM

## 2023-10-20 DIAGNOSIS — M25.612 DECREASED RANGE OF MOTION OF LEFT SHOULDER: ICD-10-CM

## 2023-10-20 DIAGNOSIS — E11.65 TYPE 2 DIABETES MELLITUS WITH HYPERGLYCEMIA, WITHOUT LONG-TERM CURRENT USE OF INSULIN (HCC): Primary | ICD-10-CM

## 2023-10-20 LAB — SL AMB POCT HEMOGLOBIN AIC: 8.1 (ref ?–6.5)

## 2023-10-20 PROCEDURE — 73030 X-RAY EXAM OF SHOULDER: CPT

## 2023-10-20 NOTE — ASSESSMENT & PLAN NOTE
Wt Readings from Last 3 Encounters:   10/20/23 (!) 147 kg (324 lb)   07/20/23 (!) 145 kg (319 lb 6.4 oz)   05/15/23 (!) 144 kg (317 lb)     Continues with cardiology - they did discuss with him about possibly stopping the Actos - his daughter will reach out to them about this. He will meet with Taylor Butler to review injectable options so hopefully the actos can be stopped in the near future.

## 2023-10-20 NOTE — ASSESSMENT & PLAN NOTE
Lab Results   Component Value Date    HGBA1C 8.1 (A) 10/20/2023     Current HA1C:Increased, will meet with pharmacy to review GLP options. Last HA1C: 7.4    Last GFR:Greater than 90, utd for 2023    Urine Microalbumin: done for 2023    Last Foot Exam: done for 2023    Neuropathy symptoms: none    Eye Exam: iris exam done today.

## 2023-10-20 NOTE — PROGRESS NOTES
Name: Breezy Singh      : 1950      MRN: [id number]  Encounter Provider: FOZIA Davison  Encounter Date: 10/20/2023   Encounter department: [address] [place] [address] PRIMARY CARE    Assessment & Plan     1. Type 2 diabetes mellitus with hyperglycemia, without long-term current use of insulin [facility])  Assessment & Plan:    Lab Results   Component Value Date    HGBA1C 8.1 (A) 10/20/2023     Current HA1C:Increased, will meet with pharmacy to review GLP options. Last HA1C: 7.4    Last GFR:Greater than 90, utd for     Urine Microalbumin: done for     Last Foot Exam: done for     Neuropathy symptoms: none    Eye Exam: iris exam done today. Orders:  -     IRIS Diabetic eye exam  -     POCT hemoglobin A1c  -     Ambulatory referral to clinical pharmacy; Future    2. Primary hypertension  Assessment & Plan:  BP within acceptable parameters. 3. Chronic diastolic (congestive) heart failure (HCC)  Assessment & Plan:  Wt Readings from Last 3 Encounters:   10/20/23 (!) 147 kg (324 lb)   23 (!) 145 kg (319 lb 6.4 oz)   05/15/23 (!) 144 kg (317 lb)     Continues with cardiology - they did discuss with him about possibly stopping the Actos - his daughter will reach out to them about this. He will meet with Eugene Fox to review injectable options so hopefully the actos can be stopped in the near future. 4. Longstanding persistent atrial fibrillation ([address])  Assessment & Plan:  Controlled. 5. Class 3 severe obesity due to excess calories with serious comorbidity and body mass index (BMI) of 45.0 to 49.9 in adult [facility])  Assessment & Plan: Will discuss GLP options with pharmacy. Orders:  -     Ambulatory referral to clinical pharmacy; Future    6. Mixed hyperlipidemia  Assessment & Plan:  Currently on rosuvastatin. 7. Vaccine refused by patient    8. Acute pain of left shoulder  -     XR shoulder 2+ vw left; Future; Expected date: 10/20/2023    9.  Decreased range of motion of left shoulder  -     XR shoulder 2+ vw left; Future; Expected date: 10/20/2023           Subjective      Here today for his follow-up. He is accompanied by his daughter. His daughter is concerned with his weight gain recently and his hx of diabetes. She is asking  about the GLP injectable medication options    He also reports left shoulder pain - past hx over 20 years ago of a traumatic dislocation of his left shoulder which then led to a rotator cuff tear which led to surgery. He notes that two days ago he awoke with sharp pain in his left shoulder and the inability to raise it more that 45 degrees upward. Review of Systems   Constitutional: Negative. Respiratory: Negative. Cardiovascular: Negative. Musculoskeletal:         See HPI   Neurological: Negative. All other systems reviewed and are negative. Current Outpatient Medications on File Prior to Visit   Medication Sig   • Blood Glucose Monitoring Suppl (OneTouch Verio Reflect) w/Device KIT Check blood sugars once daily. Please substitute with appropriate alternative as covered by patient's insurance. Dx: E11.65   • furosemide (LASIX) 40 mg tablet take 1 tablet by mouth daily MAY TAKE AN EXTRA TABLET IF WEIGHT INCREASES BY 5 POUNDS   • glucose blood (OneTouch Verio) test strip Check blood sugars once daily. Please substitute with appropriate alternative as covered by patient's insurance. Dx: E11.65   • glyBURIDE (DIABETA) 5 mg tablet Take 1 tablet (5 mg total) by mouth in the morning   • metFORMIN (GLUCOPHAGE) 1000 MG tablet Take 1 tablet (1,000 mg total) by mouth 2 (two) times a day with meals   • metoprolol succinate (TOPROL-XL) 50 mg 24 hr tablet Take 1 tablet (50 mg total) by mouth daily   • nitroglycerin (NITROSTAT) 0.4 mg SL tablet Place 0.4 mg under the tongue as needed   • Omega-3 Fatty Acids (fish oil) 1,000 mg Take 1 capsule by mouth daily   • OneTouch Delica Lancets 44K MISC Check blood sugars once daily. Please substitute with appropriate alternative as covered by patient's insurance. Dx: E11.65   • pioglitazone (ACTOS) 45 mg tablet Take 1 tablet (45 mg total) by mouth in the morning   • potassium chloride (K-DUR,KLOR-CON) 10 mEq tablet take 1 tablet by mouth once daily   • rivaroxaban (XARELTO) 20 mg tablet Take 1 tablet (20 mg total) by mouth daily   • rosuvastatin (CRESTOR) 40 MG tablet Take 1 tablet (40 mg total) by mouth daily       Objective     /80 (BP Location: Left arm, Patient Position: Sitting, Cuff Size: Large)   Pulse 75   Ht 5' 9" (1.753 m)   Wt (!) 147 kg (324 lb)   SpO2 95%   BMI 47.85 kg/m²     Physical Exam  Vitals and nursing note reviewed. Constitutional:       Appearance: Normal appearance. HENT:      Head: Normocephalic and atraumatic. Eyes:      Conjunctiva/sclera: Conjunctivae normal.   Cardiovascular:      Rate and Rhythm: Normal rate and regular rhythm. Heart sounds: Normal heart sounds. No murmur heard. No gallop. Pulmonary:      Effort: Pulmonary effort is normal. No respiratory distress. Breath sounds: Normal breath sounds. No wheezing or rales. Abdominal:      General: Abdomen is flat. Musculoskeletal:      Left shoulder: No deformity. Decreased range of motion. Decreased strength. Cervical back: Neck supple. Neurological:      General: No focal deficit present. Mental Status: He is alert and oriented to person, place, and time. Mental status is at baseline. Cranial Nerves: No cranial nerve deficit. Psychiatric:         Mood and Affect: Mood normal.         Behavior: Behavior normal.         Thought Content:  Thought content normal.         Judgment: Judgment normal.       FOZIA King

## 2023-11-02 DIAGNOSIS — M19.012 PRIMARY OSTEOARTHRITIS OF LEFT SHOULDER: ICD-10-CM

## 2023-11-02 DIAGNOSIS — M25.512 ACUTE PAIN OF LEFT SHOULDER: Primary | ICD-10-CM

## 2023-11-03 NOTE — PROGRESS NOTES
[address], Pharmacist    Feli Redding is a 72 y.o. male who was referred to the pharmacist for T2DM education and management, referred by Yvette Addison, [address] [place] [address] . Telemedicine consent  The patient was identified by name and date of birth. Feli Redding was informed that this is a telemedicine visit and that the visit is being conducted through the [employer]. He agrees to proceed. .  My office door was closed. No one else was in the room. He acknowledged consent and understanding of privacy and security of the video platform. The patient has agreed to participate and understands they can discontinue the visit at any time. Assessment/ Plan       Type 2 Diabetes:  Goal A1c <7% based on ADA/AACE/ACE guidelines. Most recent A1c above goal at 8.1% (10/20/23)   Reported Home SMBG    Complications:  Microvascular complications: N/A  Macrovascular complications: CAD, HF  Current Diabetes Regimen:  Actos (stopped ~ 1 week ago), glyburide 5 mg, metformin 1000 mg BID    Changes to Medication Regimen: If PCP is in agreement with plan  Rx pended to initiate Ozempic 0.25 mg weekly x 4 weeks then increase to 0.5 mg weekly for DM control and cardioprotection. No hx pancreatitis, men2, or mtc. Discontinue actos due to hx HF  Continue glyburide 5 mg daily and metformin 1000 mg BID for now. Future plan to discontinue glyburide if possible to minimize hypoglycemia risk in age >70 yo  Provided information for [employer] which his dtr will help him apply for    Most recent labs and diabetes goals discussed     2. On Additional Therapies:  Statin: Yes  ACEI/ARB: No  ASA: No    3. Health Maintenance:  Eye Exam: Due  Foot Exam: up to date  Urine Microalbumin: up to date    4. Medication Reconciliation:   Medication list reviewed with pt at today's visit. 5. Hyperlipidemia with clinical ASCVD event:   2018 ACC/AHA lipid guidelines recommend high statin. Patient currently on high intensity and tolerating well without side effects. Lipid panel LDL < 70/LFTs acceptable. Medications: Continue rosuvastatin as prescribed. 6. HTN:   BP goal less than 130/80 mmHg. In office BP at goal, HR acceptable. Monitoring: BG fasting every other day     Referrals placed at this visit: None    Follow-up: 4 weeks     Subjective       Medication Adherence/ Tolerability:  Metformin 1000 mg BID  Actos- stopped 1 weeks ago   Glyburide 5 mg daily       2. Lifestyle:   Does exercise and tries to stay active. Discussed decreasing portion sizes for avoid GI related side effects with GLP-1s. 3. Home monitoring devices  Glucometer: Yes    Objective       Current Outpatient Medications   Medication Instructions    Blood Glucose Monitoring Suppl (OneTouch Verio Reflect) w/Device KIT Check blood sugars once daily. Please substitute with appropriate alternative as covered by patient's insurance. Dx: E11.65    furosemide (LASIX) 40 mg tablet take 1 tablet by mouth daily MAY TAKE AN EXTRA TABLET IF WEIGHT INCREASES BY 5 POUNDS    glucose blood (OneTouch Verio) test strip Check blood sugars once daily. Please substitute with appropriate alternative as covered by patient's insurance. Dx: E11.65    glyBURIDE (DIABETA) 5 mg, Oral, Daily    metFORMIN (GLUCOPHAGE) 1,000 mg, Oral, 2 times daily with meals    metoprolol succinate (TOPROL-XL) 50 mg, Oral, Daily    nitroglycerin (NITROSTAT) 0.4 mg, Sublingual, As needed    Omega-3 Fatty Acids (fish oil) 1,000 mg 1 capsule, Oral, Daily    OneTouch Delica Lancets 83T MISC Check blood sugars once daily. Please substitute with appropriate alternative as covered by patient's insurance.  Dx: E11.65    pioglitazone (ACTOS) 45 mg, Oral, Daily    potassium chloride (K-DUR,KLOR-CON) 10 mEq tablet 10 mEq, Oral, Daily    rivaroxaban (XARELTO) 20 mg, Oral, Daily    rosuvastatin (CRESTOR) 40 mg, Oral, Daily     No Known Allergies    Immunization History Administered Date(s) Administered    COVID-19 MODERNA VACC 0.5 ML IM 04/12/2021, 05/10/2021    INFLUENZA 01/09/2013, 01/14/2020    Influenza Injectable, MDCK, Preservative Free, Quadrivalent, 0.5 mL 10/04/2016    Influenza Split 01/06/2010, 01/05/2011, 01/11/2012    Influenza Split High Dose Preservative Free IM 10/18/2017, 12/04/2018    Influenza, seasonal, injectable 01/09/2013, 10/14/2014, 09/23/2015, 10/04/2016, 10/18/2017, 12/04/2018    Pneumococcal Conjugate 13-Valent 04/05/2017    Pneumococcal Polysaccharide PPV23 08/23/2018    Tdap 03/23/2016, 02/23/2022    influenza, trivalent, adjuvanted 09/25/2013       I have reviewed the patient's allergies, medications and history as noted in the electronic medical record and updated as necessary. Lab Results   Component Value Date    HGBA1C 8.1 (A) 10/20/2023    HGBA1C 7.4 (A) 07/20/2023    HGBA1C 7.4 (H) 03/31/2023       Blood Glucose Readings  The patient is currently checking blood glucose 1 times per day. Patient does not report with SMBG logs. Yesterday  mg/dL      ASCVD Risk:  The ASCVD Risk score (Taylor DK, et al., 2019) failed to calculate for the following reasons: The valid total cholesterol range is 130 to 320 mg/dL     Vitals: There were no vitals filed for this visit.     Labs:    Lab Results   Component Value Date    SODIUM 135 04/02/2023    K 4.0 04/02/2023    EGFR 86 04/02/2023    CREATININE 0.89 04/02/2023    MICROALBCRE 53 (H) 04/12/2023         Pharmacist Tracking Tool     Pharmacist Tracking Tool  Reason For Outreach: Embedded Pharmacist  Demographics:  Intervention Method: Video  Type of Intervention: New  Topics Addressed: Diabetes and Obesity  Pharmacologic Interventions: Medication Initiation, Medication Discontinuation, and Med Rec  Non-Pharmacologic Interventions: Cost, Disease state education, Home Monitoring, and Medication/Device education  Time:  Direct Patient Care:  30  mins  Care Coordination:  15 mins  Recommendation Recipient: Patient/Caregiver and Provider  Outcome: Accepted

## 2023-11-06 ENCOUNTER — CLINICAL SUPPORT (OUTPATIENT)
Dept: FAMILY MEDICINE CLINIC | Facility: CLINIC | Age: 73
End: 2023-11-06

## 2023-11-06 DIAGNOSIS — E66.01 CLASS 3 SEVERE OBESITY DUE TO EXCESS CALORIES WITH SERIOUS COMORBIDITY AND BODY MASS INDEX (BMI) OF 45.0 TO 49.9 IN ADULT (HCC): ICD-10-CM

## 2023-11-06 DIAGNOSIS — E11.65 TYPE 2 DIABETES MELLITUS WITH HYPERGLYCEMIA, WITHOUT LONG-TERM CURRENT USE OF INSULIN (HCC): ICD-10-CM

## 2023-11-06 RX ORDER — MULTIVITAMIN
1 TABLET ORAL DAILY
COMMUNITY

## 2023-11-06 RX ORDER — MULTIVIT WITH MINERALS/LUTEIN
1000 TABLET ORAL DAILY
COMMUNITY

## 2023-11-17 ENCOUNTER — TELEPHONE (OUTPATIENT)
Dept: CARDIOLOGY CLINIC | Facility: CLINIC | Age: 73
End: 2023-11-17

## 2023-12-04 ENCOUNTER — CLINICAL SUPPORT (OUTPATIENT)
Dept: FAMILY MEDICINE CLINIC | Facility: CLINIC | Age: 73
End: 2023-12-04

## 2023-12-04 DIAGNOSIS — E11.65 TYPE 2 DIABETES MELLITUS WITH HYPERGLYCEMIA, WITHOUT LONG-TERM CURRENT USE OF INSULIN (HCC): Primary | ICD-10-CM

## 2023-12-04 NOTE — PROGRESS NOTES
[address], Pharmacist    Camila Dumas is a 72 y.o. male who was referred to the pharmacist for T2DM education and management, referred by Jorge L Lu, [address] [place] [address] . Telemedicine consent  The patient was identified by name and date of birth. Camila Dumas was informed that this is a telemedicine visit and that the visit is being conducted through the [employer]. He agrees to proceed. .  My office door was closed. No one else was in the room. He acknowledged consent and understanding of privacy and security of the video platform. The patient has agreed to participate and understands they can discontinue the visit at any time. Assessment/ Plan       Type 2 Diabetes:  Goal A1c <7% based on ADA/AACE/ACE guidelines. Most recent A1c above goal at 8.1% (10/20/23)   Reported Home SMBG  Fasting readings are elevated in the 190 - 200 mg/dL range  Complications:  Microvascular complications: N/A  Macrovascular complications: CAD, HF  Cost: Enrolled in [employer]   Current Diabetes Regimen:  glyburide 5 mg  metformin 1000 mg BID  Ozempic 0.25 mg weekly    Changes to Medication Regimen: If PCP is in agreement with plan  Increase Ozempic as planned to 0.5 mg weekly   Patient has upcoming cataract surgery on 1/29/24      Most recent labs and diabetes goals discussed     2. On Additional Therapies:  Statin: Yes  ACEI/ARB: No  ASA: No    3. Health Maintenance:  Eye Exam: Due  Foot Exam: up to date  Urine Microalbumin: up to date    4. Medication Reconciliation:   Medication list reviewed with pt at today's visit. 5. Hyperlipidemia with clinical ASCVD event:   2018 ACC/AHA lipid guidelines recommend high statin. Patient currently on high intensity and tolerating well without side effects. Lipid panel LDL < 70/LFTs acceptable. Medications: Continue rosuvastatin as prescribed. 6. HTN:   BP goal less than 130/80 mmHg.    In office BP at goal, HR acceptable. Monitoring: BG fasting every other day     Referrals placed at this visit: None    Follow-up: 5 weeks     Subjective       Medication Adherence/ Tolerability:  Metformin 1000 mg BID  Glyburide 5 mg daily   Ozempic 0.25 mg weekly- has done 4 injections so far. Denies N/V/D. Tolerating well so far. Accidentally wasted first dose in pen. DISCONTINUED Actos as instructed      2. Lifestyle:   Does exercise and tries to stay active. Discussed decreasing portion sizes for avoid GI related side effects with GLP-1s. 3. Home monitoring devices  Glucometer: Yes    Objective       Current Outpatient Medications   Medication Instructions    Blood Glucose Monitoring Suppl (OneTouch Verio Reflect) w/Device KIT Check blood sugars once daily. Please substitute with appropriate alternative as covered by patient's insurance. Dx: E11.65    furosemide (LASIX) 40 mg tablet take 1 tablet by mouth daily MAY TAKE AN EXTRA TABLET IF WEIGHT INCREASES BY 5 POUNDS    glucose blood (OneTouch Verio) test strip Check blood sugars once daily. Please substitute with appropriate alternative as covered by patient's insurance. Dx: E11.65    glyBURIDE (DIABETA) 5 mg, Oral, Daily    metFORMIN (GLUCOPHAGE) 1,000 mg, Oral, 2 times daily with meals    metoprolol succinate (TOPROL-XL) 50 mg, Oral, Daily    Multiple Vitamin (multivitamin) tablet 1 tablet, Oral, Daily    nitroglycerin (NITROSTAT) 0.4 mg, Sublingual, As needed    Omega-3 Fatty Acids (fish oil) 1,000 mg 1 capsule, Oral, Daily    OneTouch Delica Lancets 62Q MISC Check blood sugars once daily. Please substitute with appropriate alternative as covered by patient's insurance.  Dx: E11.65    potassium chloride (K-DUR,KLOR-CON) 10 mEq tablet 10 mEq, Oral, Daily    rivaroxaban (XARELTO) 20 mg, Oral, Daily    rosuvastatin (CRESTOR) 40 mg, Oral, Daily    semaglutide, 0.25 or 0.5 mg/dose, (Ozempic, 0.25 or 0.5 MG/DOSE,) 2 mg/3 mL injection pen 0.25 mg under the skin every 7 days for 4 doses (28 days), THEN 0.5 mg under the skin every 7 days    vitamin C 1,000 mg, Oral, Daily     No Known Allergies    Immunization History   Administered Date(s) Administered    COVID-19 MODERNA VACC 0.5 ML IM 04/12/2021, 05/10/2021    INFLUENZA 01/09/2013, 01/14/2020    Influenza Injectable, MDCK, Preservative Free, Quadrivalent, 0.5 mL 10/04/2016    Influenza Split 01/06/2010, 01/05/2011, 01/11/2012    Influenza Split High Dose Preservative Free IM 10/18/2017, 12/04/2018    Influenza, seasonal, injectable 01/09/2013, 10/14/2014, 09/23/2015, 10/04/2016, 10/18/2017, 12/04/2018    Pneumococcal Conjugate 13-Valent 04/05/2017    Pneumococcal Polysaccharide PPV23 08/23/2018    Tdap 03/23/2016, 02/23/2022    influenza, trivalent, adjuvanted 09/25/2013       I have reviewed the patient's allergies, medications and history as noted in the electronic medical record and updated as necessary. Lab Results   Component Value Date    HGBA1C 8.1 (A) 10/20/2023    HGBA1C 7.4 (A) 07/20/2023    HGBA1C 7.4 (H) 03/31/2023       Blood Glucose Readings  The patient is currently checking blood glucose 2 times per day. Patient reports with SMBG logs. Date AM Dinner   11/20 125    11/21 160 174   11/22 136 147   11/23 -- --   11/24 191 244   11/25 196 216   11/26 208    11/27 185 148   11/28 183 --   11/29 202 116   11/30 209 179   12/1 202 150   12/2 202 137   12/3 197    12/4 168    Avg       Hypoglycemia: The patient had 0 episodes/symptoms of hypoglycemia. Weight on home scale  11/20/23: 317 lbs  12/44/23: 314 lbs     ASCVD Risk:  The ASCVD Risk score (Colleen WHITTEN, et al., 2019) failed to calculate for the following reasons: The valid total cholesterol range is 130 to 320 mg/dL     Vitals: There were no vitals filed for this visit.     Labs:    Lab Results   Component Value Date    SODIUM 135 04/02/2023    K 4.0 04/02/2023    EGFR 86 04/02/2023    CREATININE 0.89 04/02/2023    MICROALBCRE 53 (H) 04/12/2023 Pharmacist Tracking Tool     Pharmacist Tracking Tool  Reason For Outreach: Embedded Pharmacist  Demographics:  Intervention Method: Phone  Type of Intervention: Follow-Up  Topics Addressed: Diabetes and Obesity  Pharmacologic Interventions: Dose or Frequency Adjusted  Non-Pharmacologic Interventions: Cost, Disease state education, Home Monitoring, and Medication/Device education  Time:  Direct Patient Care:  15  mins  Care Coordination:  15  mins  Recommendation Recipient: Patient/Caregiver and Provider  Outcome: Accepted

## 2024-01-08 ENCOUNTER — CLINICAL SUPPORT (OUTPATIENT)
Dept: FAMILY MEDICINE CLINIC | Facility: CLINIC | Age: 74
End: 2024-01-08

## 2024-01-08 DIAGNOSIS — E11.65 TYPE 2 DIABETES MELLITUS WITH HYPERGLYCEMIA, WITHOUT LONG-TERM CURRENT USE OF INSULIN (HCC): Primary | ICD-10-CM

## 2024-01-08 NOTE — PROGRESS NOTES
[facility]  Ceci Veloz, Pharmacist    Jluis Baltazar is a 73 y.o. male who was referred to the pharmacist for T2DM education and management, referred by FOZIA Ching .      Telemedicine consent  The patient was identified by name and date of birth. Jluis Baltazar was informed that this is a telemedicine visit and that the visit is being conducted through the Epic Embedded platform. He agrees to proceed..  My office door was closed. No one else was in the room.  He acknowledged consent and understanding of privacy and security of the video platform. The patient has agreed to participate and understands they can discontinue the visit at any time.    Assessment/ Plan       Type 2 Diabetes:  Goal A1c <7% based on ADA/AACE/ACE guidelines. Most recent A1c above goal at 8.1% (10/20/23)   Reported Home SMBG  Fasting readings are elevated  and average 190 mg/dL  Complications:  Microvascular complications: N/A  Macrovascular complications: CAD, HF  Cost: Enrolled in [employer] for med cost   Current Diabetes Regimen:  glyburide 5 mg  metformin 1000 mg BID  Ozempic 0.5 mg weekly    Changes to Medication Regimen:   If PCP is in agreement with plan  Increase Ozempic to 1 mg once weekly. Rx pended  Patient has cataract surgery 1/15/24 and 1/29/24. Surgeon told him to not use Ozempic within 6 days of surgery. He asked about his morning diabetes meds that he needs to take with food (metformin and glyburide). I advised him to hold the morning doses and he can resume metformin that evening.     Most recent labs and diabetes goals discussed     2. On Additional Therapies:  Statin: Yes  ACEI/ARB: No  ASA: No    Monitoring: BG fasting every other day     Referrals placed at this visit: None    Follow-up: 4 weeks     Subjective       Medication Adherence/ Tolerability:  Metformin 1000 mg BID  Glyburide 5 mg daily   Ozempic 0.5 mg weekly- 3 doses so far. Due for 4th dose today. Denies side  effects at this time.    2. Lifestyle:   Does exercise and tries to stay active. Discussed decreasing portion sizes for avoid GI related side effects with GLP-1s.     3. Home monitoring devices  Glucometer: Yes    Objective       Current Outpatient Medications   Medication Instructions    Blood Glucose Monitoring Suppl (OneTouch Verio Reflect) w/Device KIT Check blood sugars once daily. Please substitute with appropriate alternative as covered by patient's insurance. Dx: E11.65    furosemide (LASIX) 40 mg tablet take 1 tablet by mouth daily MAY TAKE AN EXTRA TABLET IF WEIGHT INCREASES BY 5 POUNDS    glucose blood (OneTouch Verio) test strip Check blood sugars once daily. Please substitute with appropriate alternative as covered by patient's insurance. Dx: E11.65    glyBURIDE (DIABETA) 5 mg, Oral, Daily    metFORMIN (GLUCOPHAGE) 1,000 mg, Oral, 2 times daily with meals    metoprolol succinate (TOPROL-XL) 50 mg, Oral, Daily    Multiple Vitamin (multivitamin) tablet 1 tablet, Oral, Daily    nitroglycerin (NITROSTAT) 0.4 mg, Sublingual, As needed    Omega-3 Fatty Acids (fish oil) 1,000 mg 1 capsule, Oral, Daily    OneTouch Delica Lancets 33G MISC Check blood sugars once daily. Please substitute with appropriate alternative as covered by patient's insurance. Dx: E11.65    potassium chloride (K-DUR,KLOR-CON) 10 mEq tablet 10 mEq, Oral, Daily    rivaroxaban (XARELTO) 20 mg, Oral, Daily    rosuvastatin (CRESTOR) 40 mg, Oral, Daily    semaglutide, 0.25 or 0.5 mg/dose, (Ozempic, 0.25 or 0.5 MG/DOSE,) 2 mg/3 mL injection pen 0.25 mg under the skin every 7 days for 4 doses (28 days), THEN 0.5 mg under the skin every 7 days    vitamin C 1,000 mg, Oral, Daily     No Known Allergies    Immunization History   Administered Date(s) Administered    COVID-19 MODERNA VACC 0.5 ML IM 04/12/2021, 05/10/2021    INFLUENZA 01/09/2013, 01/14/2020    Influenza Injectable, MDCK, Preservative Free, Quadrivalent, 0.5 mL 10/04/2016    Influenza  Split 01/06/2010, 01/05/2011, 01/11/2012    Influenza Split High Dose Preservative Free IM 10/18/2017, 12/04/2018    Influenza, seasonal, injectable 01/09/2013, 10/14/2014, 09/23/2015, 10/04/2016, 10/18/2017, 12/04/2018    Pneumococcal Conjugate 13-Valent 04/05/2017    Pneumococcal Polysaccharide PPV23 08/23/2018    Tdap 03/23/2016, 02/23/2022    influenza, trivalent, adjuvanted 09/25/2013       I have reviewed the patient's allergies, medications and history as noted in the electronic medical record and updated as necessary.    Lab Results   Component Value Date    HGBA1C 8.1 (A) 10/20/2023    HGBA1C 7.4 (A) 07/20/2023    HGBA1C 7.4 (H) 03/31/2023       Blood Glucose Readings  The patient is currently checking blood glucose 2 times per day. Patient reports with SMBG logs.    Date AM Dinner   12/25 204    12/26 224    12/27 159    12/28 179    12/29 156    12/30 178    12/31 229    1/1 210    1/2 171    1/4 198    1/5 198    1/6 176    1/7 194    1/8 196    Avg 190      Hypoglycemia: The patient had 0 episodes/symptoms of hypoglycemia.     Weight on home scale  11/20/23: 317 lbs  12/44/23: 314 lbs   1/8/23: 308 - 310 lbs    ASCVD Risk:  The ASCVD Risk score (Colleen DK, et al., 2019) failed to calculate for the following reasons:    The valid total cholesterol range is 130 to 320 mg/dL     Vitals:  There were no vitals filed for this visit.    Labs:    Lab Results   Component Value Date    SODIUM 135 04/02/2023    K 4.0 04/02/2023    EGFR 86 04/02/2023    CREATININE 0.89 04/02/2023    MICROALBCRE 53 (H) 04/12/2023         Pharmacist Tracking Tool     Pharmacist Tracking Tool  Reason For Outreach: Embedded Pharmacist  Demographics:  Intervention Method: Video  Type of Intervention: Follow-Up  Topics Addressed: Diabetes and Obesity  Pharmacologic Interventions: Dose or Frequency Adjusted  Non-Pharmacologic Interventions: Cost, Disease state education, Home Monitoring, and Medication/Device education  Time:  Direct  Patient Care:  15  mins  Care Coordination:  15  mins  Recommendation Recipient: Patient/Caregiver and Provider  Outcome: Accepted

## 2024-01-13 DIAGNOSIS — I50.33 ACUTE ON CHRONIC DIASTOLIC (CONGESTIVE) HEART FAILURE (HCC): ICD-10-CM

## 2024-01-15 RX ORDER — FUROSEMIDE 40 MG/1
TABLET ORAL
Qty: 100 TABLET | Refills: 1 | Status: SHIPPED | OUTPATIENT
Start: 2024-01-15

## 2024-02-05 ENCOUNTER — CLINICAL SUPPORT (OUTPATIENT)
Dept: FAMILY MEDICINE CLINIC | Facility: CLINIC | Age: 74
End: 2024-02-05

## 2024-02-05 DIAGNOSIS — E11.65 TYPE 2 DIABETES MELLITUS WITH HYPERGLYCEMIA, WITHOUT LONG-TERM CURRENT USE OF INSULIN (HCC): Primary | ICD-10-CM

## 2024-02-05 NOTE — PROGRESS NOTES
[facility]  Ceci Veloz, Pharmacist    Jluis Baltazar is a 74 y.o. male who was referred to the pharmacist for T2DM education and management, referred by FOZIA Ching .      Telemedicine consent  The patient was identified by name and date of birth. Jluis Baltazar was informed that this is a telemedicine visit and that the visit is being conducted through the Epic Embedded platform. He agrees to proceed..  My office door was closed. No one else was in the room.  He acknowledged consent and understanding of privacy and security of the video platform. The patient has agreed to participate and understands they can discontinue the visit at any time.    Assessment/ Plan       Type 2 Diabetes:  Goal A1c <7% based on ADA/AACE/ACE guidelines. Most recent A1c above goal at 8.1% (10/20/23). Due for repeat A1c  Reported Home SMBG  Fasting readings are elevated and average 174 mg/dL  Complications:  Microvascular complications: N/A  Macrovascular complications: CAD, HF  Cost: Enrolled in [employer] for [employer]   Current Diabetes Regimen:  glyburide 5 mg  metformin 1000 mg BID  Ozempic 1.0 mg weekly    Changes to Medication Regimen:   If PCP is in agreement with plan  Patient needs follow up with pcp. Missed appt on 1/19/24. His dtr will call to reschedule.   Rx pended to increase Ozempic to 2 mg weekly on 2/12/24. When Ozempic is increased, stop glyburide     Most recent labs and diabetes goals discussed     2. On Additional Therapies:  Statin: Yes  ACEI/ARB: No  ASA: No    Monitoring: BG fasting every other day     Referrals placed at this visit: None    Follow-up: 4 weeks     Subjective       Medication Adherence/ Tolerability:  Metformin 1000 mg BID  Glyburide 5 mg daily   Ozempic 1 mg weekly- Denies side effects at this time. Tolerating well. Today's was 4th injections    2. Lifestyle:   Does exercise and tries to stay active. Discussed decreasing portion sizes for avoid GI  related side effects with GLP-1s.     3. Home monitoring devices  Glucometer: Yes    Objective     Lab Results   Component Value Date    HGBA1C 8.1 (A) 10/20/2023    HGBA1C 7.4 (A) 07/20/2023    HGBA1C 7.4 (H) 03/31/2023       Blood Glucose Readings  The patient is currently checking blood glucose 2 times per day. Patient reports with SMBG logs.    Date AM Dinner   1/22 173     189     161     184     153     207     212     167    2/1 192     188     115     167     179    2/5 164    Avg 175      Hypoglycemia: The patient had 0 episodes/symptoms of hypoglycemia.     Weight on home scale  11/20/23: 317 lbs  12/44/23: 314 lbs   1/8/24: 308 - 310 lbs  2/5/24: 304-305 lbs     ASCVD Risk:  The ASCVD Risk score (Colleen WHITTEN, et al., 2019) failed to calculate for the following reasons:    The valid total cholesterol range is 130 to 320 mg/dL       Pharmacist Tracking Tool     Pharmacist Tracking Tool  Reason For Outreach: Embedded Pharmacist  Demographics:  Intervention Method: Video  Type of Intervention: Follow-Up  Topics Addressed: Diabetes and Obesity  Pharmacologic Interventions: Medication Discontinuation and Dose or Frequency Adjusted  Non-Pharmacologic Interventions: Home Monitoring and Medication/Device education  Time:  Direct Patient Care:  15  mins  Care Coordination:  15  mins  Recommendation Recipient: Patient/Caregiver and Provider  Outcome: Accepted

## 2024-02-06 ENCOUNTER — OFFICE VISIT (OUTPATIENT)
Dept: FAMILY MEDICINE CLINIC | Facility: CLINIC | Age: 74
End: 2024-02-06
Payer: COMMERCIAL

## 2024-02-06 VITALS
BODY MASS INDEX: 46.12 KG/M2 | DIASTOLIC BLOOD PRESSURE: 82 MMHG | WEIGHT: 311.4 LBS | HEIGHT: 69 IN | HEART RATE: 92 BPM | SYSTOLIC BLOOD PRESSURE: 132 MMHG | OXYGEN SATURATION: 99 %

## 2024-02-06 DIAGNOSIS — E78.2 MIXED HYPERLIPIDEMIA: ICD-10-CM

## 2024-02-06 DIAGNOSIS — I48.11 LONGSTANDING PERSISTENT ATRIAL FIBRILLATION (HCC): ICD-10-CM

## 2024-02-06 DIAGNOSIS — I25.10 CORONARY ARTERY DISEASE INVOLVING NATIVE CORONARY ARTERY OF NATIVE HEART WITHOUT ANGINA PECTORIS: ICD-10-CM

## 2024-02-06 DIAGNOSIS — E11.65 TYPE 2 DIABETES MELLITUS WITH HYPERGLYCEMIA, WITHOUT LONG-TERM CURRENT USE OF INSULIN (HCC): Primary | ICD-10-CM

## 2024-02-06 DIAGNOSIS — E66.01 CLASS 3 SEVERE OBESITY DUE TO EXCESS CALORIES WITH SERIOUS COMORBIDITY AND BODY MASS INDEX (BMI) OF 45.0 TO 49.9 IN ADULT (HCC): ICD-10-CM

## 2024-02-06 DIAGNOSIS — Z12.11 SCREENING FOR COLON CANCER: ICD-10-CM

## 2024-02-06 DIAGNOSIS — G47.33 OSA (OBSTRUCTIVE SLEEP APNEA): ICD-10-CM

## 2024-02-06 DIAGNOSIS — K59.01 SLOW TRANSIT CONSTIPATION: ICD-10-CM

## 2024-02-06 DIAGNOSIS — K63.5 POLYP OF COLON, UNSPECIFIED PART OF COLON, UNSPECIFIED TYPE: ICD-10-CM

## 2024-02-06 DIAGNOSIS — I10 PRIMARY HYPERTENSION: ICD-10-CM

## 2024-02-06 DIAGNOSIS — I50.32 CHRONIC DIASTOLIC (CONGESTIVE) HEART FAILURE (HCC): ICD-10-CM

## 2024-02-06 LAB — SL AMB POCT HEMOGLOBIN AIC: 8.3 (ref ?–6.5)

## 2024-02-06 PROCEDURE — 83036 HEMOGLOBIN GLYCOSYLATED A1C: CPT | Performed by: NURSE PRACTITIONER

## 2024-02-06 PROCEDURE — 99214 OFFICE O/P EST MOD 30 MIN: CPT | Performed by: NURSE PRACTITIONER

## 2024-02-06 NOTE — ASSESSMENT & PLAN NOTE
Lab Results   Component Value Date    HGBA1C 8.3 (A) 02/06/2024   Will reach out to Clin pharm regarding increase HA1C despite noted weight loss.      He has a follow-up with clin pharm on 03/04/2024.     Current Medication: qzempic, metformin    Checks Home Blood Sugar: yes    Current HA1C: 8.3 increased    Last HA1C: 8.1    Last GFR: due in 04/2024    Urine Microalbumin: due 04/2024    Last Foot Exam: due 04/2024    Neuropathy symptoms: no    Eye Exam: [facility], recent cataract surgery for one eye and is returning for the other.

## 2024-02-06 NOTE — ASSESSMENT & PLAN NOTE
Wt Readings from Last 3 Encounters:   02/06/24 (!) 141 kg (311 lb 6.4 oz)   10/20/23 (!) 147 kg (324 lb)   07/20/23 (!) 145 kg (319 lb 6.4 oz)     Recent weight loss.  Is short of breath when resting but reports he does not feel overtaxed.

## 2024-02-06 NOTE — PROGRESS NOTES
Name: Jluis Baltazar      : 1950      MRN: [id number]  Encounter Provider: FOZIA Ching  Encounter Date: 2024   Encounter department: [facility] PRIMARY CARE    Assessment & Plan     1. Type 2 diabetes mellitus with hyperglycemia, without long-term current use of insulin (HCC)  Assessment & Plan:    Lab Results   Component Value Date    HGBA1C 8.3 (A) 2024   Will reach out to Clin pharm regarding increase HA1C despite noted weight loss.      He has a follow-up with clin pharm on 2024.     Current Medication: qzempic, metformin    Checks Home Blood Sugar: yes    Current HA1C: 8.3 increased    Last HA1C: 8.1    Last GFR: due in 2024    Urine Microalbumin: due 2024    Last Foot Exam: due 2024    Neuropathy symptoms: no    Eye Exam: [facility], recent cataract surgery for one eye and is returning for the other.        Orders:  -     POCT hemoglobin A1c    2. Chronic diastolic (congestive) heart failure (HCC)  Assessment & Plan:  Wt Readings from Last 3 Encounters:   24 (!) 141 kg (311 lb 6.4 oz)   10/20/23 (!) 147 kg (324 lb)   23 (!) 145 kg (319 lb 6.4 oz)     Recent weight loss.  Is short of breath when resting but reports he does not feel overtaxed.              3. Longstanding persistent atrial fibrillation (HCC)  Assessment & Plan:  Controlled.  Takes xarelto, metoprolol.        4. Primary hypertension  Assessment & Plan:  BP in acceptable range.        5. MIGUEL (obstructive sleep apnea)  Assessment & Plan:  Does not use CPAP - does not tolerate mask.        6. Coronary artery disease involving native coronary artery of native heart without angina pectoris  Assessment & Plan:  Hx of CAD, seen by cardiology..        7. Class 3 severe obesity due to excess calories with serious comorbidity and body mass index (BMI) of 45.0 to 49.9 in adult ([facility])    8. Mixed hyperlipidemia  Assessment & Plan:  Continues on statin.        9. Polyp of colon, unspecified  part of colon, unspecified type  -     Ambulatory Referral to Gastroenterology; Future    10. Slow transit constipation  -     Ambulatory Referral to Gastroenterology; Future    11. Screening for colon cancer  -     Ambulatory Referral to Gastroenterology; Future           Subjective      Here today for a follow-up.  Reports he has been using the Ozempic as directed.  He has stopped using Actos. His HA1C has increased from 8.1 to 8.3.   He has lost over 20 pounds with the Ozempic.  Reports no stomach issues - no diarrhea or upset stomach.  Notes he checks his blood sugar daily - levels vary from 165 to 195.     He had recent cataract surgery - right eye first and will then have his left eye done this week.      He has been seeing the clinical pharmacist due to his ozempic.      Reports a hx of constipation - notes it has always been an issue but he manages.  Denies any change in this with the start of Ozempic.            Review of Systems   Constitutional: Negative.    Respiratory: Negative.     Cardiovascular: Negative.    Gastrointestinal:  Positive for constipation.   All other systems reviewed and are negative.      Current Outpatient Medications on File Prior to Visit   Medication Sig   • Ascorbic Acid (vitamin C) 1000 MG tablet Take 1,000 mg by mouth daily   • Blood Glucose Monitoring Suppl (OneTouch Verio Reflect) w/Device KIT Check blood sugars once daily. Please substitute with appropriate alternative as covered by patient's insurance. Dx: E11.65   • furosemide (LASIX) 40 mg tablet take 1 tablet by mouth daily. MAY TAKE AN EXTRA TABLET IF WEIGHT INCREASES BY 5 POUNDS   • glucose blood (OneTouch Verio) test strip Check blood sugars once daily. Please substitute with appropriate alternative as covered by patient's insurance. Dx: E11.65   • metFORMIN (GLUCOPHAGE) 1000 MG tablet Take 1 tablet (1,000 mg total) by mouth 2 (two) times a day with meals   • metoprolol succinate (TOPROL-XL) 50 mg 24 hr tablet Take 1  "tablet (50 mg total) by mouth daily   • Multiple Vitamin (multivitamin) tablet Take 1 tablet by mouth daily   • nitroglycerin (NITROSTAT) 0.4 mg SL tablet Place 0.4 mg under the tongue as needed   • Omega-3 Fatty Acids (fish oil) 1,000 mg Take 1 capsule by mouth daily   • OneTouch Delica Lancets 33G MISC Check blood sugars once daily. Please substitute with appropriate alternative as covered by patient's insurance. Dx: E11.65   • potassium chloride (K-DUR,KLOR-CON) 10 mEq tablet take 1 tablet by mouth once daily   • rivaroxaban (XARELTO) 20 mg tablet Take 1 tablet (20 mg total) by mouth daily   • rosuvastatin (CRESTOR) 40 MG tablet Take 1 tablet (40 mg total) by mouth daily   • semaglutide, 2 mg/dose, (Ozempic) 8 mg/ mL injection pen Inject 0.75 mL (2 mg total) under the skin every 7 days       Objective     /82 (BP Location: Left arm, Patient Position: Sitting, Cuff Size: Large)   Pulse 92   Ht 5' 9\" (1.753 m)   Wt (!) 141 kg (311 lb 6.4 oz)   SpO2 99%   BMI 45.99 kg/m²     Physical Exam  Vitals and nursing note reviewed.   Constitutional:       Appearance: Normal appearance.   HENT:      Head: Normocephalic and atraumatic.   Eyes:      Conjunctiva/sclera: Conjunctivae normal.   Cardiovascular:      Rate and Rhythm: Normal rate and regular rhythm.      Heart sounds: Normal heart sounds. No murmur heard.     No gallop.   Pulmonary:      Effort: Pulmonary effort is normal. No respiratory distress.      Breath sounds: Normal breath sounds. No wheezing or rales.   Abdominal:      General: Abdomen is flat.   Musculoskeletal:      Cervical back: Neck supple.   Neurological:      General: No focal deficit present.      Mental Status: He is alert and oriented to person, place, and time. Mental status is at baseline.      Cranial Nerves: No cranial nerve deficit.   Psychiatric:         Mood and Affect: Mood normal.         Behavior: Behavior normal.         Thought Content: Thought content normal.         " Judgment: Judgment normal.       FOZIA Ching

## 2024-02-09 ENCOUNTER — PATIENT MESSAGE (OUTPATIENT)
Dept: FAMILY MEDICINE CLINIC | Facility: CLINIC | Age: 74
End: 2024-02-09

## 2024-03-24 DIAGNOSIS — I50.33 ACUTE ON CHRONIC DIASTOLIC (CONGESTIVE) HEART FAILURE (HCC): ICD-10-CM

## 2024-03-25 RX ORDER — POTASSIUM CHLORIDE 750 MG/1
10 TABLET, EXTENDED RELEASE ORAL DAILY
Qty: 90 TABLET | Refills: 1 | Status: SHIPPED | OUTPATIENT
Start: 2024-03-25

## 2024-05-08 DIAGNOSIS — I48.11 LONGSTANDING PERSISTENT ATRIAL FIBRILLATION (HCC): ICD-10-CM

## 2024-05-23 LAB
LEFT EYE DIABETIC RETINOPATHY: NORMAL
RIGHT EYE DIABETIC RETINOPATHY: NORMAL

## 2024-07-07 DIAGNOSIS — E11.65 TYPE 2 DIABETES MELLITUS WITH HYPERGLYCEMIA, WITHOUT LONG-TERM CURRENT USE OF INSULIN (HCC): ICD-10-CM

## 2024-07-07 RX ORDER — SEMAGLUTIDE 2.68 MG/ML
INJECTION, SOLUTION SUBCUTANEOUS
Qty: 3 ML | Refills: 0 | Status: SHIPPED | OUTPATIENT
Start: 2024-07-07

## 2024-07-08 ENCOUNTER — PATIENT MESSAGE (OUTPATIENT)
Dept: FAMILY MEDICINE CLINIC | Facility: CLINIC | Age: 74
End: 2024-07-08

## 2024-07-09 DIAGNOSIS — E78.2 MIXED HYPERLIPIDEMIA: ICD-10-CM

## 2024-07-09 DIAGNOSIS — E11.65 TYPE 2 DIABETES MELLITUS WITH HYPERGLYCEMIA, WITHOUT LONG-TERM CURRENT USE OF INSULIN (HCC): ICD-10-CM

## 2024-07-09 DIAGNOSIS — I10 PRIMARY HYPERTENSION: ICD-10-CM

## 2024-07-09 RX ORDER — METOPROLOL SUCCINATE 50 MG/1
50 TABLET, EXTENDED RELEASE ORAL DAILY
Qty: 90 TABLET | Refills: 1 | Status: SHIPPED | OUTPATIENT
Start: 2024-07-09

## 2024-07-09 RX ORDER — ROSUVASTATIN CALCIUM 40 MG/1
40 TABLET, COATED ORAL DAILY
Qty: 90 TABLET | Refills: 1 | Status: SHIPPED | OUTPATIENT
Start: 2024-07-09

## 2024-07-12 NOTE — PROGRESS NOTES
[facility]  Ceci Veloz, Pharmacist    Jluis Baltazar is a 74 y.o. male who was referred to the pharmacist for T2DM education and management, referred by FOZIA Ching .      Telemedicine consent  The patient was identified by name and date of birth. Jluis Baltazar was informed that this is a telemedicine visit and that the visit is being conducted through the Epic Embedded platform. He agrees to proceed..  My office door was closed. No one else was in the room.  He acknowledged consent and understanding of privacy and security of the video platform. The patient has agreed to participate and understands they can discontinue the visit at any time.    Assessment/ Plan       Type 2 Diabetes:  Goal A1c <7% based on ADA/AACE/ACE guidelines. Most recent A1c  Lab Results   Component Value Date    HGBA1C 8.3 (A) 02/06/2024     Reported Home SMBG  Fasting readings are elevated and average 201 mg/dL  Complications:  Microvascular complications: N/A  Macrovascular complications: CAD, HF  Cost: Enrolled in [employer] for [employer] cost   Current Diabetes Regimen:  Metformin 1000 mg BID  Ozempic 2.0 mg weekly    Changes to Medication Regimen:   If PCP is in agreement with plan  He is doing well and having weight loss on Ozempic however blood sugar readings and A1c remain above goal. He is on max dose of 2 mg weekly.   Initiate Jardiance 10 mg daily. Additional benefit due to hx of heart failure. Rx sent per CPA agreement.    2. On Additional Therapies:  Statin: Yes  ACEI/ARB: No  ASA: No    Monitoring: BG fasting every other day     Referrals placed at this visit: None    Follow-up: 4 weeks     Subjective       Medication Adherence/ Tolerability:  Metformin 1000 mg BID  Ozempic 2 mg weekly-tolerating well. Decreased appetite. Having appropriate weight loss     2. Lifestyle:   Does exercise and tries to stay active.   Eating smaller portions and healthier meals with more lean protein and  vegetables    3. Home monitoring devices  Glucometer: Yes    Objective     Lab Results   Component Value Date    HGBA1C 8.3 (A) 02/06/2024    HGBA1C 8.1 (A) 10/20/2023    HGBA1C 7.4 (A) 07/20/2023       Blood Glucose Readings  The patient is currently checking blood glucose 2 times per day. Patient reports with SMBG logs.    Date AM Dinner   7/1 201     185     186     212     207     205     208     226     224     161     190     203     187    7/15 228    Avg 201      Hypoglycemia: The patient had 0 episodes/symptoms of hypoglycemia.     Weight on home scale  11/20/23: 317 lbs  12/44/23: 314 lbs   1/8/24: 308 - 310 lbs  2/5/24: 304-305 lbs   7/15/24: 280 lbs    ASCVD Risk:  The ASCVD Risk score (Colleen WHITTEN, et al., 2019) failed to calculate for the following reasons:    The valid total cholesterol range is 130 to 320 mg/dL       Pharmacist Tracking Tool     Pharmacist Tracking Tool  Reason For Outreach: Embedded Pharmacist  Demographics:  Intervention Method: Video  Type of Intervention: Follow-Up  Topics Addressed: Diabetes and Obesity  Pharmacologic Interventions: Medication Initiation  Non-Pharmacologic Interventions: Home Monitoring and Medication/Device education  Time:  Direct Patient Care:  20  mins  Care Coordination:  5  mins  Recommendation Recipient: Patient/Caregiver and Provider  Outcome: Accepted

## 2024-07-15 ENCOUNTER — CLINICAL SUPPORT (OUTPATIENT)
Dept: FAMILY MEDICINE CLINIC | Facility: CLINIC | Age: 74
End: 2024-07-15

## 2024-07-15 DIAGNOSIS — E11.65 TYPE 2 DIABETES MELLITUS WITH HYPERGLYCEMIA, WITHOUT LONG-TERM CURRENT USE OF INSULIN (HCC): Primary | ICD-10-CM

## 2024-07-19 DIAGNOSIS — I50.33 ACUTE ON CHRONIC DIASTOLIC (CONGESTIVE) HEART FAILURE (HCC): ICD-10-CM

## 2024-07-19 RX ORDER — FUROSEMIDE 40 MG/1
TABLET ORAL
Qty: 100 TABLET | Refills: 1 | Status: SHIPPED | OUTPATIENT
Start: 2024-07-19

## 2024-07-25 ENCOUNTER — OFFICE VISIT (OUTPATIENT)
Dept: FAMILY MEDICINE CLINIC | Facility: CLINIC | Age: 74
End: 2024-07-25
Payer: COMMERCIAL

## 2024-07-25 VITALS
BODY MASS INDEX: 41.65 KG/M2 | WEIGHT: 281.2 LBS | DIASTOLIC BLOOD PRESSURE: 76 MMHG | HEART RATE: 94 BPM | HEIGHT: 69 IN | SYSTOLIC BLOOD PRESSURE: 107 MMHG | OXYGEN SATURATION: 98 %

## 2024-07-25 DIAGNOSIS — E11.65 TYPE 2 DIABETES MELLITUS WITH HYPERGLYCEMIA, WITHOUT LONG-TERM CURRENT USE OF INSULIN (HCC): Primary | ICD-10-CM

## 2024-07-25 DIAGNOSIS — I48.11 LONGSTANDING PERSISTENT ATRIAL FIBRILLATION (HCC): ICD-10-CM

## 2024-07-25 DIAGNOSIS — G47.33 OSA (OBSTRUCTIVE SLEEP APNEA): ICD-10-CM

## 2024-07-25 DIAGNOSIS — I10 PRIMARY HYPERTENSION: ICD-10-CM

## 2024-07-25 DIAGNOSIS — I25.10 CORONARY ARTERY DISEASE INVOLVING NATIVE CORONARY ARTERY OF NATIVE HEART WITHOUT ANGINA PECTORIS: ICD-10-CM

## 2024-07-25 DIAGNOSIS — I50.32 CHRONIC DIASTOLIC (CONGESTIVE) HEART FAILURE (HCC): ICD-10-CM

## 2024-07-25 LAB
CREAT UR-MCNC: 51.8 MG/DL
MICROALBUMIN UR-MCNC: 22 MG/L
MICROALBUMIN/CREAT 24H UR: 42 MG/G CREATININE (ref 0–30)
SL AMB POCT HEMOGLOBIN AIC: 7.9 (ref ?–6.5)

## 2024-07-25 PROCEDURE — G0439 PPPS, SUBSEQ VISIT: HCPCS | Performed by: NURSE PRACTITIONER

## 2024-07-25 PROCEDURE — 82043 UR ALBUMIN QUANTITATIVE: CPT | Performed by: NURSE PRACTITIONER

## 2024-07-25 PROCEDURE — 82570 ASSAY OF URINE CREATININE: CPT | Performed by: NURSE PRACTITIONER

## 2024-07-25 PROCEDURE — 83036 HEMOGLOBIN GLYCOSYLATED A1C: CPT | Performed by: NURSE PRACTITIONER

## 2024-07-25 PROCEDURE — 99213 OFFICE O/P EST LOW 20 MIN: CPT | Performed by: NURSE PRACTITIONER

## 2024-07-25 RX ORDER — BLOOD SUGAR DIAGNOSTIC
STRIP MISCELLANEOUS
Qty: 100 EACH | Refills: 3 | Status: SHIPPED | OUTPATIENT
Start: 2024-07-25

## 2024-07-25 NOTE — ASSESSMENT & PLAN NOTE
Wt Readings from Last 3 Encounters:   02/06/24 (!) 141 kg (311 lb 6.4 oz)   10/20/23 (!) 147 kg (324 lb)   07/20/23 (!) 145 kg (319 lb 6.4 oz)       echo 10/22 normal EF, afib constant during study    Seen by cardiology in 01/2024.    Continues on lasix 40 mg daily - takes extra tablet if weight gain per cardiology rec.

## 2024-07-25 NOTE — ASSESSMENT & PLAN NOTE
Continues with our clinical pharmacist - currently on ozempic and metformin.     Blood sugar continues to be elevated at times.   Per last pharm visit on 07/15/2024:  He is doing well and having weight loss on Ozempic however blood sugar readings and A1c remain above goal. He is on max dose of 2 mg weekly.   Initiate Jardiance 10 mg daily. Additional benefit due to hx of heart failure.

## 2024-07-25 NOTE — PROGRESS NOTES
Ambulatory Visit  Name: Jluis Baltazar      : 1950      MRN: [id number]  Encounter Provider: FOZIA Ching  Encounter Date: 2024   Encounter department: [facility] PRIMARY CARE    Assessment & Plan   1. Type 2 diabetes mellitus with hyperglycemia, without long-term current use of insulin (HCC)  Assessment & Plan:  Continues with our clinical pharmacist - currently on ozempic and metformin.     Blood sugar continues to be elevated at times.   Per last pharm visit on 07/15/2024:  He is doing well and having weight loss on Ozempic however blood sugar readings and A1c remain above goal. He is on max dose of 2 mg weekly.   Initiate Jardiance 10 mg daily. Additional benefit due to hx of heart failure.    Orders:  -     POCT hemoglobin A1c  -     Albumin / creatinine urine ratio; Future; Expected date: 2024  -     glucose blood (OneTouch Verio) test strip; Check blood sugars once daily. Please substitute with appropriate alternative as covered by patient's insurance. Dx: E11.65  -     Albumin / creatinine urine ratio  2. Primary hypertension  3. Longstanding persistent atrial fibrillation (HCC)  Assessment & Plan:  On xarelto  Toprol-XL  Followed by   4. Coronary artery disease involving native coronary artery of native heart without angina pectoris  Assessment & Plan:  Continues on statin  - tolerating well.    5. Chronic diastolic (congestive) heart failure (HCC)  Assessment & Plan:  Wt Readings from Last 3 Encounters:   24 (!) 141 kg (311 lb 6.4 oz)   10/20/23 (!) 147 kg (324 lb)   23 (!) 145 kg (319 lb 6.4 oz)       echo 10/22 normal EF, afib constant during study    Seen by cardiology in 2024.    Continues on lasix 40 mg daily - takes extra tablet if weight gain per cardiology rec.         6. MIGUEL (obstructive sleep apnea)  Assessment & Plan:  Refused CPAP       Preventive health issues were discussed with patient, and age appropriate screening tests were ordered as  noted in patient's After Visit Summary. Personalized health advice and appropriate referrals for health education or preventive services given if needed, as noted in patient's After Visit Summary.    History of Present Illness     Here today for his annual wellness - reports no new issues.  He is with a hx of CHF and A-fib - he is followed by cardiology.  He is also with T2DM - on trulicity and 2 oral medications - HA1C has been decreasing.         Patient Care Team:  FOZIA Ching as PCP - General (Family Medicine)    Review of Systems  Medical History Reviewed by provider this encounter:       Annual Wellness Visit Questionnaire   Jluis is here for his Subsequent Wellness visit.     Health Risk Assessment:   Patient rates overall health as very good. Patient feels that their physical health rating is much better. Patient is satisfied with their life. Eyesight was rated as same. Hearing was rated as same. Patient feels that their emotional and mental health rating is same. Patients states they are never, rarely angry. Patient states they are sometimes unusually tired/fatigued. Pain experienced in the last 7 days has been some. Patient's pain rating has been 3/10. Patient states that he has experienced weight loss or gain in last 6 months.     Depression Screening:   PHQ-2 Score: 0      Fall Risk Screening:   In the past year, patient has experienced: no history of falling in past year      Home Safety:  Patient does not have trouble with stairs inside or outside of their home. Patient has no working smoke alarms and has no working carbon monoxide detector. Home safety hazards include: none.     Nutrition:   Current diet is Diabetic.     Medications:   Patient is not currently taking any over-the-counter supplements. Patient is able to manage medications.     Activities of Daily Living (ADLs)/Instrumental Activities of Daily Living (IADLs):   Walk and transfer into and out of bed and chair?: Yes  Dress  and groom yourself?: Yes    Bathe or shower yourself?: Yes    Feed yourself? Yes  Do your laundry/housekeeping?: Yes  Manage your money, pay your bills and track your expenses?: Yes  Make your own meals?: Yes    Do your own shopping?: Yes    Previous Hospitalizations:   Any hospitalizations or ED visits within the last 12 months?: No      Advance Care Planning:   Living will: No    Durable POA for healthcare: No    Advanced directive: No    Advanced directive counseling given: Yes    ACP document given: Yes    Patient declined ACP directive: No    End of Life Decisions reviewed with patient: Yes    Provider agrees with end of life decisions: Yes      Cognitive Screening:   Provider or family/friend/caregiver concerned regarding cognition?: No    PREVENTIVE SCREENINGS      Cardiovascular Screening:    General: Screening Not Indicated and History Lipid Disorder      Diabetes Screening:     General: Screening Not Indicated and History Diabetes      Colorectal Cancer Screening:     General: Screening Current      Prostate Cancer Screening:    General: Screening Not Indicated      Osteoporosis Screening:    General: Screening Not Indicated      Abdominal Aortic Aneurysm (AAA) Screening:    Risk factors include: age between 65-76 yo        General: Screening Not Indicated      Lung Cancer Screening:     General: Screening Not Indicated      Hepatitis C Screening:    General: Screening Not Indicated    Screening, Brief Intervention, and Referral to Treatment (SBIRT)    Screening  Typical number of drinks in a day: 0  Typical number of drinks in a week: 0  Interpretation: Low risk drinking behavior.    Single Item Drug Screening:  How often have you used an illegal drug (including marijuana) or a prescription medication for non-medical reasons in the past year? never    Single Item Drug Screen Score: 0  Interpretation: Negative screen for possible drug use disorder    Social Determinants of Health     Financial Resource  "Strain: Low Risk  (7/20/2023)    Overall Financial Resource Strain (CARDIA)     Difficulty of Paying Living Expenses: Not very hard   Food Insecurity: No Food Insecurity (7/25/2024)    Hunger Vital Sign     Worried About Running Out of Food in the Last Year: Never true     Ran Out of Food in the Last Year: Never true   Transportation Needs: No Transportation Needs (7/25/2024)    PRAPARE - Transportation     Lack of Transportation (Medical): No     Lack of Transportation (Non-Medical): No   Housing Stability: Low Risk  (7/25/2024)    Housing Stability Vital Sign     Unable to Pay for Housing in the Last Year: No     Number of Times Moved in the Last Year: 1     Homeless in the Last Year: No   Utilities: Not At Risk (7/25/2024)    [facility] Utilities     Threatened with loss of utilities: No     No results found.    Objective     /76 (BP Location: Left arm, Patient Position: Sitting, Cuff Size: Large)   Pulse 94   Ht 5' 9\" (1.753 m)   Wt 128 kg (281 lb 3.2 oz)   SpO2 98%   BMI 41.53 kg/m²     Physical Exam  Constitutional:       Appearance: Normal appearance.   HENT:      Head: Normocephalic and atraumatic.   Cardiovascular:      Rate and Rhythm: Normal rate and regular rhythm.      Pulses: no weak pulses.           Dorsalis pedis pulses are 2+ on the right side and 2+ on the left side.      Heart sounds: No murmur heard.     No gallop.   Pulmonary:      Effort: Pulmonary effort is normal. No respiratory distress.      Breath sounds: Normal breath sounds. No wheezing or rales.   Musculoskeletal:      Cervical back: Neck supple.   Feet:      Right foot:      Skin integrity: No ulcer, skin breakdown, erythema, warmth, callus or dry skin.      Left foot:      Skin integrity: No ulcer, skin breakdown, erythema, warmth, callus or dry skin.   Neurological:      General: No focal deficit present.      Mental Status: He is alert and oriented to person, place, and time. Mental status is at baseline.   Psychiatric:         " Mood and Affect: Mood normal.         Behavior: Behavior normal.         Thought Content: Thought content normal.         Judgment: Judgment normal.         Diabetic Foot Exam    Patient's shoes and socks removed.    Right Foot/Ankle   Right Foot Inspection  Skin Exam: skin normal and skin intact. No dry skin, no warmth, no callus, no erythema, no maceration, no abnormal color, no pre-ulcer, no ulcer and no callus.     Toe Exam: ROM and strength within normal limits.     Sensory   Monofilament testing: intact    Vascular  The right DP pulse is 2+.     Left Foot/Ankle  Left Foot Inspection  Skin Exam: skin normal and skin intact. No dry skin, no warmth, no erythema, no maceration, normal color, no pre-ulcer, no ulcer and no callus.     Toe Exam: ROM and strength within normal limits.     Sensory   Monofilament testing: intact    Vascular  The left DP pulse is 2+.     Assign Risk Category  No deformity present  No loss of protective sensation  No weak pulses  Risk: 0

## 2024-07-29 ENCOUNTER — TELEPHONE (OUTPATIENT)
Dept: ADMINISTRATIVE | Facility: OTHER | Age: 74
End: 2024-07-29

## 2024-07-29 NOTE — TELEPHONE ENCOUNTER
----- Message from Cyndy BETANCUR sent at 7/26/2024  3:42 PM EDT -----  Regarding: Care Gap request  07/26/24 3:42 PM    Hello, our patient attached above has had Hepatitis C completed/performed. Please assist in updating the patient chart by pulling the Care Everywhere (CE) document. The date of service is 2009.     Hello, our patient attached above has had HIV completed/performed. Please assist in updating the patient chart by pulling the Care Everywhere (CE) document. The date of service is 2009  .     Thank you,  Cyndy Ariza  [facility] PRIMARY CARE

## 2024-08-01 DIAGNOSIS — E11.65 TYPE 2 DIABETES MELLITUS WITH HYPERGLYCEMIA, WITHOUT LONG-TERM CURRENT USE OF INSULIN (HCC): ICD-10-CM

## 2024-08-01 NOTE — TELEPHONE ENCOUNTER
Upon review of the In Basket request we were able to locate, review, and update the patient chart as requested for Hepatitis C  and HIV.    Any additional questions or concerns should be emailed to the Practice Liaisons via the appropriate education email address, please do not reply via In Basket.    Thank you  Amelia Morin   [facility]

## 2024-08-02 RX ORDER — LANCETS 33 GAUGE
EACH MISCELLANEOUS
Qty: 100 EACH | Refills: 1 | Status: SHIPPED | OUTPATIENT
Start: 2024-08-02

## 2024-08-05 DIAGNOSIS — I50.33 ACUTE ON CHRONIC DIASTOLIC (CONGESTIVE) HEART FAILURE (HCC): ICD-10-CM

## 2024-08-06 ENCOUNTER — TELEPHONE (OUTPATIENT)
Dept: FAMILY MEDICINE CLINIC | Facility: CLINIC | Age: 74
End: 2024-08-06

## 2024-08-06 RX ORDER — FUROSEMIDE 40 MG/1
40 TABLET ORAL DAILY
Qty: 100 TABLET | Refills: 0 | Status: SHIPPED | OUTPATIENT
Start: 2024-08-06

## 2024-08-06 RX ORDER — POTASSIUM CHLORIDE 750 MG/1
10 TABLET, EXTENDED RELEASE ORAL DAILY
Qty: 90 TABLET | Refills: 0 | Status: SHIPPED | OUTPATIENT
Start: 2024-08-06

## 2024-08-12 DIAGNOSIS — I48.11 LONGSTANDING PERSISTENT ATRIAL FIBRILLATION (HCC): ICD-10-CM

## 2024-08-12 DIAGNOSIS — I10 PRIMARY HYPERTENSION: ICD-10-CM

## 2024-08-12 RX ORDER — METOPROLOL SUCCINATE 50 MG/1
50 TABLET, EXTENDED RELEASE ORAL DAILY
Qty: 90 TABLET | Refills: 1 | Status: SHIPPED | OUTPATIENT
Start: 2024-08-12

## 2024-08-20 ENCOUNTER — TELEPHONE (OUTPATIENT)
Dept: ADMINISTRATIVE | Facility: OTHER | Age: 74
End: 2024-08-20

## 2024-08-20 NOTE — TELEPHONE ENCOUNTER
----- Message from Cyndy BETANCUR sent at 8/19/2024  1:37 PM EDT -----  Regarding: Care Gap request  08/19/24 1:37 PM    Hello, our patient attached above has had Diabetic Eye Exam completed/performed. Please assist in updating the patient chart by pulling the document from the Media Tab. The date of service is 7/1/2024  .     Thank you,  Cyndy Ariza  [facility] PRIMARY [facility]

## 2024-08-20 NOTE — TELEPHONE ENCOUNTER
Upon review of the In Basket request we have found/obtained the documentation. After careful review of the document we are unable to complete this request for Diabetic Eye Exam because the documentation does not have the proper verbiage (wording) needed to close the requested care gap(s).    Any additional questions or concerns should be emailed to the Practice Liaisons via the appropriate education email address, please do not reply via In Basket.    Thank you  SKYLAR SCHILLING MA   [facility]

## 2024-08-21 ENCOUNTER — CLINICAL SUPPORT (OUTPATIENT)
Dept: FAMILY MEDICINE CLINIC | Facility: CLINIC | Age: 74
End: 2024-08-21

## 2024-08-21 DIAGNOSIS — E11.65 TYPE 2 DIABETES MELLITUS WITH HYPERGLYCEMIA, WITHOUT LONG-TERM CURRENT USE OF INSULIN (HCC): Primary | ICD-10-CM

## 2024-08-21 NOTE — PROGRESS NOTES
[facility] Clinical Integration Pharmacy Services  Ceci Veloz, Pharmacist    Jluis Baltazar is a 74 y.o. male who was referred to the pharmacist for T2DM education and management, referred by FOZIA Ching .      Telemedicine consent  The patient was identified by name and date of birth. Jluis Baltazar was informed that this is a telemedicine visit and that the visit is being conducted through the Epic Embedded platform. He agrees to proceed..  My office door was closed. No one else was in the room.  He acknowledged consent and understanding of privacy and security of the video platform. The patient has agreed to participate and understands they can discontinue the visit at any time.    Assessment/ Plan       Type 2 Diabetes:  Goal A1c <7% based on ADA/AACE/ACE guidelines. Most recent A1c  Lab Results   Component Value Date    HGBA1C 7.9 (A) 07/25/2024     Reported Home SMBG  Fasting readings are elevated and average 216 mg/dL  Complications:  Microvascular complications: N/A  Macrovascular complications: CAD, HF  Cost: Enrolled in [employer] for [employer] cost   Current Diabetes Regimen:  Metformin 1000 mg BID  Ozempic 2.0 mg weekly  Jardiance 10 mg daily    Changes to Medication Regimen:   If PCP is in agreement with plan  He recently started Jardiance 10 mg daily. Tolerating without side effects. Continue current medication regimen. Room to increase Jardiance to 25 mg daily if needed  Updated BMP ordered      2. On Additional Therapies:  Statin: Yes  ACEI/ARB: No  ASA: No    Monitoring: BG fasting every other day     Referrals placed at this visit: None    Follow-up: 4 weeks     Subjective       Medication Adherence/ Tolerability:  Metformin 1000 mg BID  Ozempic 2 mg weekly-tolerating well. Decreased appetite. Having appropriate weight loss   Jardiance 10 mg daily- new medication started last visit. Increase in urination and thirst but otherwise tolerating it     2. Lifestyle:   Does exercise and tries to stay  active.   Eating smaller portions and healthier meals with more lean protein and vegetables    3. Home monitoring devices  Glucometer: Yes    Objective     Lab Results   Component Value Date    HGBA1C 7.9 (A) 07/25/2024    HGBA1C 8.3 (A) 02/06/2024    HGBA1C 8.1 (A) 10/20/2023       Blood Glucose Readings  The patient is currently checking blood glucose 2 times per day. Patient reports with SMBG logs.    Date AM Dinner   8/1 219     220     199     238     200     --     255     190     199     216     167     258     229     221     245     198     256     196     188     228    8/21 199    Avg 216      Hypoglycemia: The patient had 0 episodes/symptoms of hypoglycemia.     Weight on home scale  11/20/23: 317 lbs  12/44/23: 314 lbs   1/8/24: 308 - 310 lbs  2/5/24: 304-305 lbs   7/15/24: 280 lbs  8/21/24: 273 lbs    ASCVD Risk:  The ASCVD Risk score (Colleen WHITTEN, et al., 2019) failed to calculate for the following reasons:    The valid total cholesterol range is 130 to 320 mg/dL       Pharmacist Tracking Tool     Pharmacist Tracking Tool  Reason For Outreach: Embedded Pharmacist  Demographics:  Intervention Method: Video  Type of Intervention: Follow-Up  Topics Addressed: Diabetes and Obesity  Pharmacologic Interventions: N/A  Non-Pharmacologic Interventions: Home Monitoring, Labs, and Medication/Device education  Time:  Direct Patient Care:  20  mins  Care Coordination:  5  mins  Recommendation Recipient: Patient/Caregiver and Provider  Outcome: Accepted

## 2024-09-03 DIAGNOSIS — E11.65 TYPE 2 DIABETES MELLITUS WITH HYPERGLYCEMIA, WITHOUT LONG-TERM CURRENT USE OF INSULIN (HCC): ICD-10-CM

## 2024-09-05 RX ORDER — SEMAGLUTIDE 2.68 MG/ML
INJECTION, SOLUTION SUBCUTANEOUS
Qty: 3 ML | Refills: 0 | Status: SHIPPED | OUTPATIENT
Start: 2024-09-05

## 2024-09-10 ENCOUNTER — TELEPHONE (OUTPATIENT)
Dept: ADMINISTRATIVE | Facility: OTHER | Age: 74
End: 2024-09-10

## 2024-09-10 NOTE — TELEPHONE ENCOUNTER
09/10/24 3:03 PM    Patient was flagged by a Third Party Payer report as being due for a refill on the following medications,   Rosuvastatin calcium . Patient was contacted to review these medications. There was no answer and a message was left.     Thank you.  Frankie Stevens MA  [facility]

## 2024-09-18 ENCOUNTER — CLINICAL SUPPORT (OUTPATIENT)
Dept: FAMILY MEDICINE CLINIC | Facility: CLINIC | Age: 74
End: 2024-09-18

## 2024-09-18 DIAGNOSIS — E11.65 TYPE 2 DIABETES MELLITUS WITH HYPERGLYCEMIA, WITHOUT LONG-TERM CURRENT USE OF INSULIN (HCC): Primary | ICD-10-CM

## 2024-09-18 DIAGNOSIS — E78.2 MIXED HYPERLIPIDEMIA: ICD-10-CM

## 2024-09-18 RX ORDER — ROSUVASTATIN CALCIUM 40 MG/1
40 TABLET, COATED ORAL DAILY
Qty: 90 TABLET | Refills: 1 | Status: SHIPPED | OUTPATIENT
Start: 2024-09-18

## 2024-09-18 NOTE — ASSESSMENT & PLAN NOTE
History of clinical ASCVD with type 2 diabetes (high risk) LDL goal less than 55.  Most recent LDL at goal.  On high intensity statin.  Due for updated lipid panel.  Due for refill on his rosuvastatin.    Lab Results   Component Value Date    LDLCALC 39 03/31/2023           Orders:    Lipid Panel With Direct LDL; Future    rosuvastatin (CRESTOR) 40 MG tablet; Take 1 tablet (40 mg total) by mouth daily

## 2024-09-18 NOTE — ASSESSMENT & PLAN NOTE
Goal A1c <7-7.5%% .   Lab Results   Component Value Date    HGBA1C 7.9 (A) 07/25/2024      Complications:  Microvascular complications: N/A  Macrovascular complications: CAD, HF  Current Diabetes Regimen:  Metformin 1000 mg BID  Ozempic 2.0 mg weekly  Jardiance 10 mg daily  Historical DM Meds (reason for discontinuation):  N/A  On Additional Therapies:  Statin: yes  ACEI/ARB: no    Assessment: Fasting glucose readings continue to trend down and improved.  Patient continues with weight loss.  Tolerating Jardiance and Ozempic without issues.    Changes to medication regimen:  No changes today continue current regimen    Orders:    semaglutide, 2 mg/dose, (Ozempic, 2 MG/DOSE,) 8 mg/ mL injection pen; Inject 0.75 mL (2 mg total) under the skin every 7 days    Lipid Panel With Direct LDL; Future

## 2024-09-18 NOTE — PROGRESS NOTES
[facility] Clinical Pharmacy Services  Ceci Veloz, Pharmacist    Assessment/ Plan     Assessment & Plan  Type 2 diabetes mellitus with hyperglycemia, without long-term current use of insulin ([facility])  Goal A1c <7-7.5%% .   Lab Results   Component Value Date    HGBA1C 7.9 (A) 07/25/2024      Complications:  Microvascular complications: N/A  Macrovascular complications: CAD, HF  Current Diabetes Regimen:  Metformin 1000 mg BID  Ozempic 2.0 mg weekly  Jardiance 10 mg daily  Historical DM Meds (reason for discontinuation):  N/A  On Additional Therapies:  Statin: yes  ACEI/ARB: no    Assessment: Fasting glucose readings continue to trend down and improved.  Patient continues with weight loss.  Tolerating Jardiance and Ozempic without issues.    Changes to medication regimen:  No changes today continue current regimen    Orders:    semaglutide, 2 mg/dose, (Ozempic, 2 MG/DOSE,) 8 mg/ mL injection pen; Inject 0.75 mL (2 mg total) under the skin every 7 days    Lipid Panel With Direct LDL; Future    Mixed hyperlipidemia  History of clinical ASCVD with type 2 diabetes (high risk) LDL goal less than 55.  Most recent LDL at goal.  On high intensity statin.  Due for updated lipid panel.  Due for refill on his rosuvastatin.    Lab Results   Component Value Date    LDLCALC 39 03/31/2023           Orders:    Lipid Panel With Direct LDL; Future    rosuvastatin (CRESTOR) 40 MG tablet; Take 1 tablet (40 mg total) by mouth daily      Follow-up: As needed with clinical pharmacist; follow-up with PCP at the end of November    Subjective   HPI    Medication Adherence/ Tolerability/ Cost:  Patient denies side effects, no issues with cost, 0 missed doses in last two week  Empagliflozin Tabs  metFORMIN  OneTouch Delica Lancets 33G Misc  OneTouch Verio Reflect Kit  OneTouch Verio Strp  Ozempic (2 MG/DOSE) Sopn  potassium chloride  rivaroxaban  rosuvastatin  vitamin C    Previous Medications  N/A    Review of Systems   Gastrointestinal:   Negative for abdominal pain, constipation, diarrhea, nausea and vomiting.   Endocrine: Negative for polydipsia, polyphagia and polyuria.        2. Lifestyle:       3. Home monitoring devices  Glucometer: Yes, Brand:     Objective       Blood Glucose Readings  The patient is currently checking blood glucose 2 times per day. Patient reports with SMBG logs.     Date AM Dinne   9/1 182      202      173      198      195      172      171      171      163      201      193      219      195      201      209      218     9/18 162     Avg 189        Hypoglycemia: The patient had 0 episodes/symptoms of hypoglycemia.      Weight on home scale  11/20/23: 317 lbs  12/44/23: 314 lbs   1/8/24: 308 - 310 lbs  2/5/24: 304-305 lbs   7/15/24: 280 lbs  8/21/24: 273 lbs  9/18/24: 270 lbs      ASCVD Risk:  The ASCVD Risk score (Colleen WHITTEN, et al., 2019) failed to calculate for the following reasons:    The valid total cholesterol range is 130 to 320 mg/dL     Vitals:  There were no vitals filed for this visit.    Eye Exam:    Lab Results   Component Value Date    LEFTDIABRET None 09/17/2018    RIGHTDIABRET None 09/17/2018       Labs:  Lab Results   Component Value Date    SODIUM 141 07/26/2023    K 4.2 07/26/2023    EGFR >90 07/26/2023    CREATININE 0.8 07/26/2023    MICROALBCRE 42 (H) 07/25/2024       Lab Results   Component Value Date    HGBA1C 7.9 (A) 07/25/2024    HGBA1C 8.3 (A) 02/06/2024    HGBA1C 8.1 (A) 10/20/2023       Telemedicine consent  The patient was identified by name and date of birth. Jluis Baltazar was informed that this is a telemedicine visit and that the visit is being conducted through the Epic Embedded platform. He agrees to proceed..  My office door was closed. No one else was in the room.  He acknowledged consent and understanding of privacy and security of the video platform. The patient has agreed to participate and understands they can discontinue the visit at any time.    Pharmacist Tracking Tool      Pharmacist Tracking Tool  Reason For Outreach: Embedded Pharmacist  Demographics:  Intervention Method: Video  Type of Intervention: Follow-Up  Topics Addressed: Dyslipidemia and Hypertension  Pharmacologic Interventions: Med Rec  Non-Pharmacologic Interventions: Disease state education, Home Monitoring, Labs, and Medication/Device education  Time:  Direct Patient Care:  15  mins  Care Coordination:  10  mins  Recommendation Recipient: Patient/Caregiver and Provider  Outcome: Accepted

## 2024-09-27 ENCOUNTER — VBI (OUTPATIENT)
Dept: ADMINISTRATIVE | Facility: OTHER | Age: 74
End: 2024-09-27

## 2024-09-27 NOTE — TELEPHONE ENCOUNTER
09/27/24 12:59 PM     Chart reviewed for CRC: Colonoscopy was/were not submitted to the patient's insurance.     Suzette Fernandez MA   [facility]

## 2024-10-02 DIAGNOSIS — E11.65 TYPE 2 DIABETES MELLITUS WITH HYPERGLYCEMIA, WITHOUT LONG-TERM CURRENT USE OF INSULIN (HCC): ICD-10-CM

## 2024-10-02 RX ORDER — BLOOD SUGAR DIAGNOSTIC
STRIP MISCELLANEOUS
Qty: 200 EACH | Refills: 0 | Status: SHIPPED | OUTPATIENT
Start: 2024-10-02

## 2024-10-23 DIAGNOSIS — E11.65 TYPE 2 DIABETES MELLITUS WITH HYPERGLYCEMIA, WITHOUT LONG-TERM CURRENT USE OF INSULIN (HCC): ICD-10-CM

## 2024-10-23 RX ORDER — EMPAGLIFLOZIN 10 MG/1
10 TABLET, FILM COATED ORAL DAILY
Qty: 30 TABLET | Refills: 0 | OUTPATIENT
Start: 2024-10-23

## 2024-10-30 DIAGNOSIS — I50.33 ACUTE ON CHRONIC DIASTOLIC (CONGESTIVE) HEART FAILURE (HCC): ICD-10-CM

## 2024-10-30 RX ORDER — POTASSIUM CHLORIDE 750 MG/1
10 TABLET, EXTENDED RELEASE ORAL DAILY
Qty: 90 TABLET | Refills: 0 | Status: SHIPPED | OUTPATIENT
Start: 2024-10-30

## 2024-11-21 DIAGNOSIS — I50.33 ACUTE ON CHRONIC DIASTOLIC (CONGESTIVE) HEART FAILURE (HCC): ICD-10-CM

## 2024-11-22 RX ORDER — FUROSEMIDE 40 MG/1
40 TABLET ORAL DAILY
Qty: 100 TABLET | Refills: 0 | Status: SHIPPED | OUTPATIENT
Start: 2024-11-22

## 2024-11-29 ENCOUNTER — OFFICE VISIT (OUTPATIENT)
Dept: FAMILY MEDICINE CLINIC | Facility: CLINIC | Age: 74
End: 2024-11-29
Payer: COMMERCIAL

## 2024-11-29 ENCOUNTER — TELEPHONE (OUTPATIENT)
Dept: ADMINISTRATIVE | Facility: OTHER | Age: 74
End: 2024-11-29

## 2024-11-29 VITALS
DIASTOLIC BLOOD PRESSURE: 74 MMHG | HEIGHT: 69 IN | HEART RATE: 70 BPM | OXYGEN SATURATION: 96 % | WEIGHT: 260.5 LBS | SYSTOLIC BLOOD PRESSURE: 112 MMHG | TEMPERATURE: 97.3 F | BODY MASS INDEX: 38.58 KG/M2

## 2024-11-29 DIAGNOSIS — E66.813 CLASS 3 SEVERE OBESITY DUE TO EXCESS CALORIES WITH SERIOUS COMORBIDITY AND BODY MASS INDEX (BMI) OF 45.0 TO 49.9 IN ADULT (HCC): ICD-10-CM

## 2024-11-29 DIAGNOSIS — I10 PRIMARY HYPERTENSION: ICD-10-CM

## 2024-11-29 DIAGNOSIS — G47.33 OSA (OBSTRUCTIVE SLEEP APNEA): ICD-10-CM

## 2024-11-29 DIAGNOSIS — I50.32 CHRONIC DIASTOLIC (CONGESTIVE) HEART FAILURE (HCC): Primary | ICD-10-CM

## 2024-11-29 DIAGNOSIS — E11.65 TYPE 2 DIABETES MELLITUS WITH HYPERGLYCEMIA, WITHOUT LONG-TERM CURRENT USE OF INSULIN (HCC): ICD-10-CM

## 2024-11-29 DIAGNOSIS — E78.2 MIXED HYPERLIPIDEMIA: ICD-10-CM

## 2024-11-29 DIAGNOSIS — I25.10 CORONARY ARTERY DISEASE INVOLVING NATIVE CORONARY ARTERY OF NATIVE HEART WITHOUT ANGINA PECTORIS: ICD-10-CM

## 2024-11-29 DIAGNOSIS — E66.01 CLASS 3 SEVERE OBESITY DUE TO EXCESS CALORIES WITH SERIOUS COMORBIDITY AND BODY MASS INDEX (BMI) OF 45.0 TO 49.9 IN ADULT (HCC): ICD-10-CM

## 2024-11-29 DIAGNOSIS — I48.11 LONGSTANDING PERSISTENT ATRIAL FIBRILLATION (HCC): ICD-10-CM

## 2024-11-29 LAB — SL AMB POCT HEMOGLOBIN AIC: 7.9 (ref ?–6.5)

## 2024-11-29 PROCEDURE — 83036 HEMOGLOBIN GLYCOSYLATED A1C: CPT | Performed by: NURSE PRACTITIONER

## 2024-11-29 PROCEDURE — 99214 OFFICE O/P EST MOD 30 MIN: CPT | Performed by: NURSE PRACTITIONER

## 2024-11-29 NOTE — ASSESSMENT & PLAN NOTE
Lab Results   Component Value Date    HGBA1C 7.9 (A) 11/29/2024       Orders:    POCT hemoglobin A1c

## 2024-11-29 NOTE — PROGRESS NOTES
"Name: Jluis Baltazar      : 1950      MRN: [id number]  Encounter Provider: FOZIA Ching  Encounter Date: 2024   Encounter department: [facility] PRIMARY CARE  :  Assessment & Plan  Chronic diastolic (congestive) heart failure (HCC)  Wt Readings from Last 3 Encounters:   24 118 kg (260 lb 8 oz)   24 128 kg (281 lb 3.2 oz)   24 (!) 141 kg (311 lb 6.4 oz)       He sounds mildly short of breath at rest but reports he is having no distress.               Coronary artery disease involving native coronary artery of native heart without angina pectoris  Continues with Cardiology and statin.         Longstanding persistent atrial fibrillation (HCC)  Controlled on beta-blocker, xarelto       Primary hypertension  BP in acceptable range.        MIGUEL (obstructive sleep apnea)  Reports he does not use a CPAP at night.         Type 2 diabetes mellitus with hyperglycemia, without long-term current use of insulin (HCC)    Lab Results   Component Value Date    HGBA1C 7.9 (A) 2024       Orders:    POCT hemoglobin A1c    Class 3 severe obesity due to excess calories with serious comorbidity and body mass index (BMI) of 45.0 to 49.9 in adult (HCC)    Body weight 2023 317 lbs  Body weight today 260 lbs         Mixed hyperlipidemia  Continues with statin, no issues.                History of Present Illness     Here for a follow-up - hx of T2DM - controlled at 7.9 - on ozempic and tolerating well, has also lost several pounds.        Review of Systems   Constitutional: Negative.    Respiratory: Negative.     Cardiovascular: Negative.    Neurological: Negative.    All other systems reviewed and are negative.         Objective   /74 (BP Location: Left arm, Patient Position: Sitting, Cuff Size: Standard)   Pulse 70   Temp (!) 97.3 °F (36.3 °C) (Temporal)   Ht 5' 9\" (1.753 m)   Wt 118 kg (260 lb 8 oz)   SpO2 96%   BMI 38.47 kg/m²      Physical Exam  Constitutional:  "      Appearance: Normal appearance.   HENT:      Head: Normocephalic and atraumatic.   Cardiovascular:      Rate and Rhythm: Normal rate and regular rhythm.      Pulses:           Dorsalis pedis pulses are 2+ on the right side and 2+ on the left side.      Heart sounds: No murmur heard.     No gallop.   Pulmonary:      Effort: Pulmonary effort is normal. No respiratory distress.      Breath sounds: Normal breath sounds. No wheezing or rales.   Musculoskeletal:      Cervical back: Neck supple.   Feet:      Right foot:      Skin integrity: No ulcer, skin breakdown, erythema, warmth, callus or dry skin.      Left foot:      Skin integrity: No ulcer, skin breakdown, erythema, warmth, callus or dry skin.   Neurological:      General: No focal deficit present.      Mental Status: He is alert and oriented to person, place, and time. Mental status is at baseline.   Psychiatric:         Mood and Affect: Mood normal.         Behavior: Behavior normal.         Thought Content: Thought content normal.         Judgment: Judgment normal.

## 2024-11-29 NOTE — ASSESSMENT & PLAN NOTE
Wt Readings from Last 3 Encounters:   11/29/24 118 kg (260 lb 8 oz)   07/25/24 128 kg (281 lb 3.2 oz)   02/06/24 (!) 141 kg (311 lb 6.4 oz)       He sounds mildly short of breath at rest but reports he is having no distress.

## 2024-11-29 NOTE — TELEPHONE ENCOUNTER
Upon review of the In Basket request and the patient's chart, initial outreach has been made via fax to facility. Please see Contacts section for details.     Thank you  Elaina Lucero MA

## 2024-11-29 NOTE — LETTER
Diabetic Eye Exam Form    Date Requested: 24  Patient: Jluis Baltazar  Patient : 1950   Referring Provider: FOZIA Ching      DIABETIC Eye Exam Date _______________________________      Type of Exam MUST be documented for Diabetic Eye Exams. Please CHECK ONE.     Retinal Exam       Dilated Retinal Exam       OCT       Optomap-Iris Exam      Fundus Photography       Left Eye - Please check Retinopathy or No Retinopathy        Exam did show retinopathy    Exam did not show retinopathy       Right Eye - Please check Retinopathy or No Retinopathy       Exam did show retinopathy    Exam did not show retinopathy       Comments __________________________________________________________    Practice Providing Exam ______________________________________________    Exam Performed By (print name) _______________________________________      Provider Signature ___________________________________________________      These reports are needed for  compliance.  Please fax this completed form and a copy of the Diabetic Eye Exam report to our office located at [address] [place] [address] as soon as possible via Fax [phone] attention Elaina: Phone [phone]  We thank you for your assistance in treating our mutual patient.

## 2024-11-29 NOTE — TELEPHONE ENCOUNTER
----- Message from Odalis GOSS sent at 11/29/2024 11:08 AM EST -----  Regarding: eye exam  11/29/24 11:08 AM    Hello, our patient Jluis Baltazar has had Diabetic Eye Exam completed/performed. Please assist in updating the patient chart by making an External outreach to [facility] located in [place], [place]. The date of service is May, 2024.    Thank you,  Odalis Villanueva MA  [facility] PRIMARY CARE

## 2024-11-29 NOTE — LETTER
Diabetic Eye Exam Form    Date Requested: 24  Patient: Jluis Baltazar  Patient : 1950   Referring Provider: FOZIA Ching      DIABETIC Eye Exam Date _______________________________      Type of Exam MUST be documented for Diabetic Eye Exams. Please CHECK ONE.     Retinal Exam       Dilated Retinal Exam       OCT       Optomap-Iris Exam      Fundus Photography       Left Eye - Please check Retinopathy or No Retinopathy        Exam did show retinopathy    Exam did not show retinopathy       Right Eye - Please check Retinopathy or No Retinopathy       Exam did show retinopathy    Exam did not show retinopathy       Comments __________________________________________________________    Practice Providing Exam ______________________________________________    Exam Performed By (print name) _______________________________________      Provider Signature ___________________________________________________      These reports are needed for  compliance.  Please fax this completed form and a copy of the Diabetic Eye Exam report to our office located at [address] [place] [address] as soon as possible via Fax [phone] attention Elaina: Phone [phone]  We thank you for your assistance in treating our mutual patient.

## 2024-12-04 DIAGNOSIS — E11.65 TYPE 2 DIABETES MELLITUS WITH HYPERGLYCEMIA, WITHOUT LONG-TERM CURRENT USE OF INSULIN (HCC): ICD-10-CM

## 2024-12-04 NOTE — TELEPHONE ENCOUNTER
As a follow-up, a second attempt has been made for outreach via fax to facility. Please see Contacts section for details.    Thank you  Elaina Lucero MA

## 2024-12-05 RX ORDER — EMPAGLIFLOZIN 10 MG/1
10 TABLET, FILM COATED ORAL DAILY
Qty: 90 TABLET | Refills: 0 | Status: SHIPPED | OUTPATIENT
Start: 2024-12-05

## 2024-12-09 ENCOUNTER — APPOINTMENT (OUTPATIENT)
Dept: LAB | Facility: CLINIC | Age: 74
End: 2024-12-09
Payer: COMMERCIAL

## 2024-12-09 DIAGNOSIS — E78.2 MIXED HYPERLIPIDEMIA: ICD-10-CM

## 2024-12-09 DIAGNOSIS — E11.65 TYPE 2 DIABETES MELLITUS WITH HYPERGLYCEMIA, WITHOUT LONG-TERM CURRENT USE OF INSULIN (HCC): ICD-10-CM

## 2024-12-09 LAB
ANION GAP SERPL CALCULATED.3IONS-SCNC: 7 MMOL/L (ref 4–13)
BUN SERPL-MCNC: 15 MG/DL (ref 5–25)
CALCIUM SERPL-MCNC: 9.2 MG/DL (ref 8.4–10.2)
CHLORIDE SERPL-SCNC: 101 MMOL/L (ref 96–108)
CHOLEST SERPL-MCNC: 149 MG/DL (ref ?–200)
CO2 SERPL-SCNC: 30 MMOL/L (ref 21–32)
CREAT SERPL-MCNC: 0.7 MG/DL (ref 0.6–1.3)
GFR SERPL CREATININE-BSD FRML MDRD: 94 ML/MIN/1.73SQ M
GLUCOSE P FAST SERPL-MCNC: 161 MG/DL (ref 65–99)
HDLC SERPL-MCNC: 36 MG/DL
LDLC SERPL CALC-MCNC: 79 MG/DL (ref 0–100)
LDLC SERPL DIRECT ASSAY-MCNC: 97 MG/DL (ref 0–100)
NONHDLC SERPL-MCNC: 113 MG/DL
POTASSIUM SERPL-SCNC: 4.2 MMOL/L (ref 3.5–5.3)
SODIUM SERPL-SCNC: 138 MMOL/L (ref 135–147)
TRIGL SERPL-MCNC: 171 MG/DL (ref ?–150)

## 2024-12-09 PROCEDURE — 83721 ASSAY OF BLOOD LIPOPROTEIN: CPT

## 2024-12-09 PROCEDURE — 80048 BASIC METABOLIC PNL TOTAL CA: CPT

## 2024-12-09 PROCEDURE — 80061 LIPID PANEL: CPT

## 2024-12-09 PROCEDURE — 36415 COLL VENOUS BLD VENIPUNCTURE: CPT

## 2024-12-10 DIAGNOSIS — I48.11 LONGSTANDING PERSISTENT ATRIAL FIBRILLATION (HCC): ICD-10-CM

## 2024-12-10 DIAGNOSIS — I50.33 ACUTE ON CHRONIC DIASTOLIC (CONGESTIVE) HEART FAILURE (HCC): ICD-10-CM

## 2024-12-10 DIAGNOSIS — E11.65 TYPE 2 DIABETES MELLITUS WITH HYPERGLYCEMIA, WITHOUT LONG-TERM CURRENT USE OF INSULIN (HCC): ICD-10-CM

## 2024-12-10 DIAGNOSIS — I10 PRIMARY HYPERTENSION: ICD-10-CM

## 2024-12-10 DIAGNOSIS — E78.2 MIXED HYPERLIPIDEMIA: ICD-10-CM

## 2024-12-10 NOTE — TELEPHONE ENCOUNTER
As a final attempt, a third outreach has been made via telephone call to facility. Please see Contacts section for details. This encounter will be closed and completed by end of day. Should we receive the requested information because of previous outreach attempts, the requested patient's chart will be updated appropriately.     Thank you  Elaina Lucero MA

## 2024-12-11 ENCOUNTER — RESULTS FOLLOW-UP (OUTPATIENT)
Dept: FAMILY MEDICINE CLINIC | Facility: CLINIC | Age: 74
End: 2024-12-11

## 2024-12-11 RX ORDER — BLOOD SUGAR DIAGNOSTIC
STRIP MISCELLANEOUS
Qty: 100 EACH | Refills: 1 | Status: SHIPPED | OUTPATIENT
Start: 2024-12-11

## 2024-12-11 RX ORDER — LANCETS 33 GAUGE
EACH MISCELLANEOUS
Qty: 100 EACH | Refills: 1 | Status: SHIPPED | OUTPATIENT
Start: 2024-12-11

## 2024-12-11 RX ORDER — FUROSEMIDE 40 MG/1
40 TABLET ORAL DAILY
Qty: 90 TABLET | Refills: 1 | Status: SHIPPED | OUTPATIENT
Start: 2024-12-11

## 2024-12-11 RX ORDER — POTASSIUM CHLORIDE 750 MG/1
10 TABLET, EXTENDED RELEASE ORAL DAILY
Qty: 90 TABLET | Refills: 1 | Status: SHIPPED | OUTPATIENT
Start: 2024-12-11

## 2024-12-11 RX ORDER — METOPROLOL SUCCINATE 50 MG/1
50 TABLET, EXTENDED RELEASE ORAL DAILY
Qty: 90 TABLET | Refills: 1 | Status: SHIPPED | OUTPATIENT
Start: 2024-12-11

## 2024-12-11 RX ORDER — ROSUVASTATIN CALCIUM 40 MG/1
40 TABLET, COATED ORAL DAILY
Qty: 90 TABLET | Refills: 0 | OUTPATIENT
Start: 2024-12-11

## 2024-12-13 ENCOUNTER — TELEPHONE (OUTPATIENT)
Dept: ADMINISTRATIVE | Facility: OTHER | Age: 74
End: 2024-12-13

## 2024-12-13 DIAGNOSIS — E11.65 TYPE 2 DIABETES MELLITUS WITH HYPERGLYCEMIA, WITHOUT LONG-TERM CURRENT USE OF INSULIN (HCC): Primary | ICD-10-CM

## 2024-12-13 NOTE — TELEPHONE ENCOUNTER
Upon review of the In Basket request we have found/obtained the documentation. After careful review of the document we are unable to complete this request for Diabetic Eye Exam because the documentation does not have the result(s) needed to close the requested care gap(s).    Any additional questions or concerns should be emailed to the Practice Liaisons via the appropriate education email address, please do not reply via In Basket.    Thank you  Amy Bhandari MA   [facility]

## 2024-12-13 NOTE — TELEPHONE ENCOUNTER
----- Message from Cyndy BETANCUR sent at 12/13/2024 11:28 AM EST -----  Regarding: Care Gap request  12/13/24 11:28 AM    Hello, our patient attached above has had Diabetic Eye Exam completed/performed. Please assist in updating the patient chart by pulling the document from the Media Tab. The date of service is 7/2024.     Thank you,  Cyndy Ariza  [facility] PRIMARY [facility]

## 2024-12-13 NOTE — TELEPHONE ENCOUNTER
Jami contacted the patient and was informed that the patient did not fast before going for blood work. Jmai is asking should the patient go back for blood work? If he does, can you schedule a lab appt for 1 to 2 weeks out for any day early morning. Jami stated that she will make sure the patient fast before coming to his appt. Thank you

## 2024-12-13 NOTE — TELEPHONE ENCOUNTER
Jami daughter informed of test results. She was not sure if her dad Jluis was fasting when labs were done. She will ask him and call back with the aswer.    She also wanted to know if Jluis has to change anything due to his Triglycerides being abnormal    Please review and advice

## 2024-12-30 ENCOUNTER — VBI (OUTPATIENT)
Dept: ADMINISTRATIVE | Facility: OTHER | Age: 74
End: 2024-12-30

## 2024-12-30 NOTE — TELEPHONE ENCOUNTER
12/30/24 7:51 AM     Chart reviewed for CRC: Colonoscopy was/were not submitted to the patient's insurance.     Suzette Fernandez MA   [facility]

## 2024-12-31 ENCOUNTER — APPOINTMENT (OUTPATIENT)
Dept: LAB | Facility: CLINIC | Age: 74
End: 2024-12-31
Payer: COMMERCIAL

## 2024-12-31 DIAGNOSIS — E11.65 TYPE 2 DIABETES MELLITUS WITH HYPERGLYCEMIA, WITHOUT LONG-TERM CURRENT USE OF INSULIN (HCC): ICD-10-CM

## 2024-12-31 LAB
ALBUMIN SERPL BCG-MCNC: 3.9 G/DL (ref 3.5–5)
ALP SERPL-CCNC: 58 U/L (ref 34–104)
ALT SERPL W P-5'-P-CCNC: 14 U/L (ref 7–52)
ANION GAP SERPL CALCULATED.3IONS-SCNC: 8 MMOL/L (ref 4–13)
AST SERPL W P-5'-P-CCNC: 17 U/L (ref 13–39)
BILIRUB SERPL-MCNC: 0.5 MG/DL (ref 0.2–1)
BUN SERPL-MCNC: 14 MG/DL (ref 5–25)
CALCIUM SERPL-MCNC: 9.6 MG/DL (ref 8.4–10.2)
CHLORIDE SERPL-SCNC: 102 MMOL/L (ref 96–108)
CO2 SERPL-SCNC: 31 MMOL/L (ref 21–32)
CREAT SERPL-MCNC: 0.81 MG/DL (ref 0.6–1.3)
GFR SERPL CREATININE-BSD FRML MDRD: 88 ML/MIN/1.73SQ M
GLUCOSE P FAST SERPL-MCNC: 148 MG/DL (ref 65–99)
POTASSIUM SERPL-SCNC: 4.2 MMOL/L (ref 3.5–5.3)
PROT SERPL-MCNC: 7.1 G/DL (ref 6.4–8.4)
SODIUM SERPL-SCNC: 141 MMOL/L (ref 135–147)

## 2024-12-31 PROCEDURE — 80053 COMPREHEN METABOLIC PANEL: CPT

## 2024-12-31 PROCEDURE — 36415 COLL VENOUS BLD VENIPUNCTURE: CPT

## 2025-01-01 ENCOUNTER — RESULTS FOLLOW-UP (OUTPATIENT)
Dept: FAMILY MEDICINE CLINIC | Facility: CLINIC | Age: 75
End: 2025-01-01

## 2025-01-21 ENCOUNTER — TELEPHONE (OUTPATIENT)
Dept: ADMINISTRATIVE | Facility: OTHER | Age: 75
End: 2025-01-21

## 2025-01-21 NOTE — TELEPHONE ENCOUNTER
----- Message from Cyndy BETANCUR sent at 1/21/2025  9:07 AM EST -----  Regarding: Care Gap request  01/21/25 9:07 AM    Hello, our patient attached above has had Diabetic Eye Exam completed/performed. Please assist in updating the patient chart by pulling the document from the Media Tab. The date of service is 2024.     Thank you,  Cyndy Ariza  [facility] PRIMARY [facility]

## 2025-01-28 ENCOUNTER — VBI (OUTPATIENT)
Dept: ADMINISTRATIVE | Facility: OTHER | Age: 75
End: 2025-01-28

## 2025-01-28 NOTE — TELEPHONE ENCOUNTER
01/28/25 1:09 PM     Chart reviewed for Hemoglobin A1c was/were submitted to the patient's insurance.     Suzette Fernandez MA   [facility]

## 2025-02-03 ENCOUNTER — TELEPHONE (OUTPATIENT)
Dept: ADMINISTRATIVE | Facility: OTHER | Age: 75
End: 2025-02-03

## 2025-02-03 DIAGNOSIS — I48.11 LONGSTANDING PERSISTENT ATRIAL FIBRILLATION (HCC): ICD-10-CM

## 2025-02-03 NOTE — TELEPHONE ENCOUNTER
----- Message from Cyndy BETANCUR sent at 2/3/2025 11:05 AM EST -----  Regarding: Care Gap request  02/03/25 11:05 AM    Hello, our patient attached above has had Diabetic Eye Exam completed/performed. Please assist in updating the patient chart by pulling the document from the Media Tab. The date of service is 5/2024.     Thank you,  Cyndy Ariza  [facility] PRIMARY [facility]

## 2025-02-03 NOTE — TELEPHONE ENCOUNTER
Upon review of the In Basket request we have found/obtained the documentation. After careful review of the document we are unable to complete this request for Diabetic Eye Exam because the documentation does not have the proper verbiage (wording) needed to close the requested care gap(s).    Any additional questions or concerns should be emailed to the Practice Liaisons via the appropriate education email address, please do not reply via In Basket.    Thank you  Tania Cueto MA   [facility]

## 2025-02-04 RX ORDER — RIVAROXABAN 20 MG/1
20 TABLET, FILM COATED ORAL DAILY
Qty: 90 TABLET | Refills: 1 | Status: SHIPPED | OUTPATIENT
Start: 2025-02-04

## 2025-03-14 DIAGNOSIS — E11.65 TYPE 2 DIABETES MELLITUS WITH HYPERGLYCEMIA, WITHOUT LONG-TERM CURRENT USE OF INSULIN (HCC): ICD-10-CM

## 2025-03-14 RX ORDER — LANCETS 33 GAUGE
EACH MISCELLANEOUS
Qty: 100 EACH | Refills: 1 | Status: SHIPPED | OUTPATIENT
Start: 2025-03-14

## 2025-03-14 RX ORDER — BLOOD SUGAR DIAGNOSTIC
STRIP MISCELLANEOUS
Qty: 100 EACH | Refills: 1 | Status: SHIPPED | OUTPATIENT
Start: 2025-03-14

## 2025-03-24 ENCOUNTER — OFFICE VISIT (OUTPATIENT)
Dept: FAMILY MEDICINE CLINIC | Facility: CLINIC | Age: 75
End: 2025-03-24
Payer: COMMERCIAL

## 2025-03-24 ENCOUNTER — APPOINTMENT (OUTPATIENT)
Dept: RADIOLOGY | Facility: CLINIC | Age: 75
End: 2025-03-24
Payer: COMMERCIAL

## 2025-03-24 VITALS
BODY MASS INDEX: 38.42 KG/M2 | OXYGEN SATURATION: 98 % | SYSTOLIC BLOOD PRESSURE: 116 MMHG | WEIGHT: 259.4 LBS | DIASTOLIC BLOOD PRESSURE: 64 MMHG | HEIGHT: 69 IN | HEART RATE: 68 BPM

## 2025-03-24 DIAGNOSIS — M25.562 ACUTE PAIN OF LEFT KNEE: ICD-10-CM

## 2025-03-24 DIAGNOSIS — R19.5 POSITIVE COLORECTAL CANCER SCREENING USING COLOGUARD TEST: ICD-10-CM

## 2025-03-24 DIAGNOSIS — M25.462 SWELLING OF JOINT OF LEFT KNEE: ICD-10-CM

## 2025-03-24 DIAGNOSIS — M17.12 PRIMARY OSTEOARTHRITIS OF LEFT KNEE: ICD-10-CM

## 2025-03-24 DIAGNOSIS — E66.01 CLASS 3 SEVERE OBESITY DUE TO EXCESS CALORIES WITH SERIOUS COMORBIDITY AND BODY MASS INDEX (BMI) OF 45.0 TO 49.9 IN ADULT (HCC): ICD-10-CM

## 2025-03-24 DIAGNOSIS — E66.813 CLASS 3 SEVERE OBESITY DUE TO EXCESS CALORIES WITH SERIOUS COMORBIDITY AND BODY MASS INDEX (BMI) OF 45.0 TO 49.9 IN ADULT (HCC): ICD-10-CM

## 2025-03-24 DIAGNOSIS — E11.65 TYPE 2 DIABETES MELLITUS WITH HYPERGLYCEMIA, WITHOUT LONG-TERM CURRENT USE OF INSULIN (HCC): Primary | ICD-10-CM

## 2025-03-24 DIAGNOSIS — I48.11 LONGSTANDING PERSISTENT ATRIAL FIBRILLATION (HCC): ICD-10-CM

## 2025-03-24 DIAGNOSIS — I50.32 CHRONIC DIASTOLIC (CONGESTIVE) HEART FAILURE (HCC): ICD-10-CM

## 2025-03-24 LAB — SL AMB POCT HEMOGLOBIN AIC: 8.2 (ref ?–6.5)

## 2025-03-24 PROCEDURE — G2211 COMPLEX E/M VISIT ADD ON: HCPCS | Performed by: NURSE PRACTITIONER

## 2025-03-24 PROCEDURE — 83036 HEMOGLOBIN GLYCOSYLATED A1C: CPT | Performed by: NURSE PRACTITIONER

## 2025-03-24 PROCEDURE — 99214 OFFICE O/P EST MOD 30 MIN: CPT | Performed by: NURSE PRACTITIONER

## 2025-03-24 PROCEDURE — 73560 X-RAY EXAM OF KNEE 1 OR 2: CPT

## 2025-03-24 RX ORDER — PREDNISONE 10 MG/1
TABLET ORAL
Qty: 13 TABLET | Refills: 0 | Status: SHIPPED | OUTPATIENT
Start: 2025-03-24 | End: 2025-03-31

## 2025-03-24 NOTE — PROGRESS NOTES
Name: Jluis Baltazar      : 1950      MRN: [id number]  Encounter Provider: FOZIA Ching  Encounter Date: 3/24/2025   Encounter department: [facility] PRIMARY CARE  :  Assessment & Plan  Type 2 diabetes mellitus with hyperglycemia, without long-term current use of insulin (HCC)    Lab Results   Component Value Date    HGBA1C 8.2 (A) 2025       Orders:  •  POCT hemoglobin A1c    Acute pain of left knee  I did review his previous xray of the left knee from  - it showed significant arthritis at that time.  Will re-image today.  Discussed seeing ortho.    Orders:  •  predniSONE 10 mg tablet; Take 4 tablets (40 mg total) by mouth daily for 1 day, THEN 2 tablets (20 mg total) daily for 3 days, THEN 1 tablet (10 mg total) daily for 3 days.    Primary osteoarthritis of left knee    Orders:  •  predniSONE 10 mg tablet; Take 4 tablets (40 mg total) by mouth daily for 1 day, THEN 2 tablets (20 mg total) daily for 3 days, THEN 1 tablet (10 mg total) daily for 3 days.    Swelling of joint of left knee    Orders:  •  predniSONE 10 mg tablet; Take 4 tablets (40 mg total) by mouth daily for 1 day, THEN 2 tablets (20 mg total) daily for 3 days, THEN 1 tablet (10 mg total) daily for 3 days.    Chronic diastolic (congestive) heart failure (HCC)  Wt Readings from Last 3 Encounters:   25 118 kg (259 lb 6.4 oz)   24 118 kg (260 lb 8 oz)   24 128 kg (281 lb 3.2 oz)                    Longstanding persistent atrial fibrillation (HCC)  Stable, no acute events.        Class 3 severe obesity due to excess calories with serious comorbidity and body mass index (BMI) of 45.0 to 49.9 in adult (HCC)  Limited activity due to left knee issues.         Positive colorectal cancer screening using Cologuard test  See notes.               History of Present Illness   Here with left knee pain.  Ongoing issues with this knee, known arthritis in it.  It is currently swollen.  It is larger in size  "generally than the right.  He has been wearing a left knee brace which has no longer been helping stabilize it.  Pain with walking and certain movements side to side.  Pain mainly in the inner portion of the left knee - evident swelling/fluid there too.      Also with a hx of T2DM, current HA1C is 8.2 which is within his general range.      He also notes he had a positive cologuard test and is aware that he needs to see GI for a colonoscopy.  He has stated that he would like to have his knee dealt with first before doing that.      Knee Pain       Review of Systems   Constitutional: Negative.    Respiratory: Negative.     Cardiovascular: Negative.    Musculoskeletal:         See HPI   Neurological: Negative.    All other systems reviewed and are negative.      Objective   /64 (BP Location: Left arm, Patient Position: Sitting, Cuff Size: Standard)   Pulse 68   Ht 5' 9\" (1.753 m)   Wt 118 kg (259 lb 6.4 oz)   SpO2 98%   BMI 38.31 kg/m²      Physical Exam  Musculoskeletal:      Left knee: Swelling present. Decreased range of motion. Tenderness present.   Neurological:      Mental Status: He is alert and oriented to person, place, and time.           "

## 2025-03-24 NOTE — ASSESSMENT & PLAN NOTE
Lab Results   Component Value Date    HGBA1C 8.2 (A) 03/24/2025       Orders:  •  POCT hemoglobin A1c

## 2025-03-24 NOTE — ASSESSMENT & PLAN NOTE
Wt Readings from Last 3 Encounters:   03/24/25 118 kg (259 lb 6.4 oz)   11/29/24 118 kg (260 lb 8 oz)   07/25/24 128 kg (281 lb 3.2 oz)

## 2025-03-25 ENCOUNTER — RESULTS FOLLOW-UP (OUTPATIENT)
Dept: FAMILY MEDICINE CLINIC | Facility: CLINIC | Age: 75
End: 2025-03-25

## 2025-03-26 ENCOUNTER — VBI (OUTPATIENT)
Dept: ADMINISTRATIVE | Facility: OTHER | Age: 75
End: 2025-03-26

## 2025-03-26 NOTE — TELEPHONE ENCOUNTER
03/26/25 2:20 PM     Chart reviewed for CRC: Cologuard was/were submitted to the patient's insurance.     Tania Cueto MA   [facility]

## 2025-03-28 DIAGNOSIS — M17.12 TRICOMPARTMENT OSTEOARTHRITIS OF LEFT KNEE: Primary | ICD-10-CM

## 2025-03-28 NOTE — TELEPHONE ENCOUNTER
Patient aware. Asking for recommendations for an Ortho doctor and for a referral to be placed for him.

## 2025-04-15 ENCOUNTER — TELEPHONE (OUTPATIENT)
Age: 75
End: 2025-04-15

## 2025-04-15 DIAGNOSIS — E78.2 MIXED HYPERLIPIDEMIA: ICD-10-CM

## 2025-04-15 DIAGNOSIS — I48.11 LONGSTANDING PERSISTENT ATRIAL FIBRILLATION (HCC): ICD-10-CM

## 2025-04-15 DIAGNOSIS — I10 PRIMARY HYPERTENSION: ICD-10-CM

## 2025-04-15 DIAGNOSIS — I50.33 ACUTE ON CHRONIC DIASTOLIC (CONGESTIVE) HEART FAILURE (HCC): ICD-10-CM

## 2025-04-15 DIAGNOSIS — E11.65 TYPE 2 DIABETES MELLITUS WITH HYPERGLYCEMIA, WITHOUT LONG-TERM CURRENT USE OF INSULIN (HCC): ICD-10-CM

## 2025-04-15 NOTE — TELEPHONE ENCOUNTER
Received call from Contreras with [facility] wanting to notify provider that he had a call with patient today and was discussed that patient is not taking his rosuvastatin daily as ordered.  He is only taking every 2-3 days because he has concerns about muscle breakdown.  Patient did report that he was having muscle pains which have decreased since he decreased his statin dosage.    Any questions/concerns Contreras can be reached at:  [phone]

## 2025-04-15 NOTE — TELEPHONE ENCOUNTER
[facility] called.  Is calling on behalf of  the patient, requesting the following medications be sent electronically to mail order pharmacy - [facility].  - Is requesting 90 day supply with 3 refills.        Medication:     metFORMIN (GLUCOPHAGE) 1000 MG tablet     Dose/Frequency:     Take 1 tablet (1,000 mg total) by mouth 2 (two) times a day with meals       Quantity: 180 tablet     Medication: potassium chloride (Klor-Con M10) 10 mEq tablet     Dose/Frequency:     Take 1 tablet (10 mEq total) by mouth daily       Quantity: 90 tablets     Medication:     furosemide (LASIX) 40 mg tablet       Dose/Frequency:     Take 1 tablet (40 mg total) by mouth daily       Quantity: 90 tablets        Medication: rosuvastatin (CRESTOR) 40 MG tablet     Dose/Frequency:     Take 1 tablet (40 mg total) by mouth daily       Quantity: 90 tablets        Medication:     metoprolol succinate (TOPROL-XL) 50 mg 24 hr tablet       Dose/Frequency:     Take 1 tablet (50 mg total) by mouth daily       Quantity: 90 tablets    Medication: Empagliflozin (Jardiance) 10 MG TABS table     Dose/Frequency:     TAKE ONE TABLET BY MOUTH EVERY DAY       Quantity: 90 tablets        Medication:     rivaroxaban (Xarelto) 20 mg tablet       Dose/Frequency:     TAKE ONE TABLET BY MOUTH EVERY DAY       Quantity: 90 tablets    Pharmacy: [facility] - [place] [address]  Phone: [phone]   Fax: [phone]    Office:   [x] PCP/Provider - FOZIA Ching   [] Speciality/Provider -     Does the patient have enough for 3 days?   [x] Yes   [] No - Send as HP to POD

## 2025-04-16 RX ORDER — FUROSEMIDE 40 MG/1
40 TABLET ORAL DAILY
Qty: 90 TABLET | Refills: 1 | Status: SHIPPED | OUTPATIENT
Start: 2025-04-16 | End: 2025-04-18 | Stop reason: SDUPTHER

## 2025-04-16 RX ORDER — METOPROLOL SUCCINATE 50 MG/1
50 TABLET, EXTENDED RELEASE ORAL DAILY
Qty: 90 TABLET | Refills: 1 | Status: SHIPPED | OUTPATIENT
Start: 2025-04-16 | End: 2025-04-18 | Stop reason: SDUPTHER

## 2025-04-16 RX ORDER — ROSUVASTATIN CALCIUM 40 MG/1
40 TABLET, COATED ORAL DAILY
Qty: 90 TABLET | Refills: 1 | Status: SHIPPED | OUTPATIENT
Start: 2025-04-16 | End: 2025-04-18 | Stop reason: SDUPTHER

## 2025-04-16 RX ORDER — POTASSIUM CHLORIDE 750 MG/1
10 TABLET, EXTENDED RELEASE ORAL DAILY
Qty: 90 TABLET | Refills: 1 | Status: SHIPPED | OUTPATIENT
Start: 2025-04-16 | End: 2025-04-18 | Stop reason: SDUPTHER

## 2025-04-18 DIAGNOSIS — I50.33 ACUTE ON CHRONIC DIASTOLIC (CONGESTIVE) HEART FAILURE (HCC): ICD-10-CM

## 2025-04-18 DIAGNOSIS — I10 PRIMARY HYPERTENSION: ICD-10-CM

## 2025-04-18 DIAGNOSIS — E78.2 MIXED HYPERLIPIDEMIA: ICD-10-CM

## 2025-04-18 DIAGNOSIS — I48.11 LONGSTANDING PERSISTENT ATRIAL FIBRILLATION (HCC): ICD-10-CM

## 2025-04-18 DIAGNOSIS — E11.65 TYPE 2 DIABETES MELLITUS WITH HYPERGLYCEMIA, WITHOUT LONG-TERM CURRENT USE OF INSULIN (HCC): ICD-10-CM

## 2025-04-18 RX ORDER — POTASSIUM CHLORIDE 750 MG/1
10 TABLET, EXTENDED RELEASE ORAL DAILY
Qty: 90 TABLET | Refills: 1 | Status: SHIPPED | OUTPATIENT
Start: 2025-04-18

## 2025-04-18 RX ORDER — METOPROLOL SUCCINATE 50 MG/1
50 TABLET, EXTENDED RELEASE ORAL DAILY
Qty: 90 TABLET | Refills: 1 | Status: SHIPPED | OUTPATIENT
Start: 2025-04-18

## 2025-04-18 RX ORDER — ROSUVASTATIN CALCIUM 40 MG/1
40 TABLET, COATED ORAL DAILY
Qty: 90 TABLET | Refills: 1 | Status: SHIPPED | OUTPATIENT
Start: 2025-04-18

## 2025-04-18 RX ORDER — FUROSEMIDE 40 MG/1
40 TABLET ORAL DAILY
Qty: 90 TABLET | Refills: 1 | Status: SHIPPED | OUTPATIENT
Start: 2025-04-18

## 2025-05-14 DIAGNOSIS — I48.21 PERMANENT ATRIAL FIBRILLATION (HCC): ICD-10-CM

## 2025-05-14 DIAGNOSIS — I25.10 ATHEROSCLEROTIC HEART DISEASE OF NATIVE CORONARY ARTERY WITHOUT ANGINA PECTORIS: ICD-10-CM

## 2025-05-23 LAB — HBA1C MFR BLD HPLC: 7.9 %

## 2025-05-29 ENCOUNTER — OFFICE VISIT (OUTPATIENT)
Dept: FAMILY MEDICINE CLINIC | Facility: CLINIC | Age: 75
End: 2025-05-29
Payer: COMMERCIAL

## 2025-05-29 VITALS
DIASTOLIC BLOOD PRESSURE: 84 MMHG | OXYGEN SATURATION: 97 % | HEART RATE: 84 BPM | SYSTOLIC BLOOD PRESSURE: 128 MMHG | BODY MASS INDEX: 37.06 KG/M2 | TEMPERATURE: 97.5 F | HEIGHT: 69 IN | WEIGHT: 250.25 LBS

## 2025-05-29 DIAGNOSIS — I48.11 LONGSTANDING PERSISTENT ATRIAL FIBRILLATION (HCC): ICD-10-CM

## 2025-05-29 DIAGNOSIS — E78.2 MIXED HYPERLIPIDEMIA: ICD-10-CM

## 2025-05-29 DIAGNOSIS — E11.65 TYPE 2 DIABETES MELLITUS WITH HYPERGLYCEMIA, WITHOUT LONG-TERM CURRENT USE OF INSULIN (HCC): ICD-10-CM

## 2025-05-29 DIAGNOSIS — I50.32 CHRONIC DIASTOLIC (CONGESTIVE) HEART FAILURE (HCC): Primary | ICD-10-CM

## 2025-05-29 DIAGNOSIS — I10 PRIMARY HYPERTENSION: ICD-10-CM

## 2025-05-29 PROCEDURE — 99214 OFFICE O/P EST MOD 30 MIN: CPT | Performed by: NURSE PRACTITIONER

## 2025-05-29 PROCEDURE — G2211 COMPLEX E/M VISIT ADD ON: HCPCS | Performed by: NURSE PRACTITIONER

## 2025-05-29 NOTE — ASSESSMENT & PLAN NOTE
Lab Results   Component Value Date    HGBA1C 8.2 (A) 03/24/2025     He averages between 7.9 to 8.3.

## 2025-05-29 NOTE — ASSESSMENT & PLAN NOTE
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

## 2025-05-29 NOTE — PROGRESS NOTES
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                Type 2 diabetes mellitus with hyperglycemia, without long-term current use of insulin (HCC)    Lab Results   Component Value Date    HGBA1C 8.2 (A) 2025     He averages between 7.9 to 8.3.         Primary hypertension  BP today in the acceptable range.         Longstanding persistent atrial fibrillation (HCC)  Heart rate irregular on exam but rate controlled - he continues with cardiology.         Mixed hyperlipidemia  Last lipid panel in 2024 - no acute concerns.               History of Present Illness   Here for a follow-up.  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        Review of Systems   Constitutional: Negative.    Respiratory: Negative.     Cardiovascular: Negative.    Neurological: Negative.    All other systems reviewed and are negative.      Objective   /84 (BP Location: Left arm, Patient Position: Sitting, Cuff Size: Standard)   Pulse 84   Temp 97.5 °F (36.4 °C) (Temporal)   Ht 5' 9\" (1.753 m)   Wt 114 kg (250 lb 4 oz)   SpO2 97%   BMI 36.96 kg/m²      Physical Exam  Constitutional:       Appearance: Normal appearance.     Cardiovascular:      Rate and Rhythm: Normal rate and regular rhythm.   Pulmonary:      Effort: Pulmonary effort is normal.      Breath sounds: Normal breath sounds.     Neurological:      Mental Status: He is alert and oriented to person, place, and time.           "

## 2025-05-30 ENCOUNTER — HOSPITAL ENCOUNTER (OUTPATIENT)
Dept: NON INVASIVE DIAGNOSTICS | Facility: HOSPITAL | Age: 75
Discharge: HOME | End: 2025-05-30
Attending: INTERNAL MEDICINE
Payer: COMMERCIAL

## 2025-05-30 VITALS
WEIGHT: 250 LBS | HEART RATE: 80 BPM | DIASTOLIC BLOOD PRESSURE: 84 MMHG | HEIGHT: 69 IN | SYSTOLIC BLOOD PRESSURE: 128 MMHG | BODY MASS INDEX: 37.03 KG/M2

## 2025-05-30 DIAGNOSIS — I25.10 ATHEROSCLEROTIC HEART DISEASE OF NATIVE CORONARY ARTERY WITHOUT ANGINA PECTORIS: ICD-10-CM

## 2025-05-30 DIAGNOSIS — I48.21 PERMANENT ATRIAL FIBRILLATION (HCC): ICD-10-CM

## 2025-05-30 LAB
AORTIC ROOT: 4.1 CM
BSA FOR ECHO PROCEDURE: 2.27 M2
DOP CALC LVOT AREA: 3.46 CM2
DOP CALC LVOT DIAMETER: 2.1 CM
E WAVE DECELERATION TIME: 171 MS
FRACTIONAL SHORTENING: 30 (ref 28–44)
INTERVENTRICULAR SEPTUM IN DIASTOLE (PARASTERNAL SHORT AXIS VIEW): 1.5 CM
INTERVENTRICULAR SEPTUM: 1.5 CM (ref 0.6–1.1)
LAAS-AP2: 25.3 CM2
LAAS-AP4: 31 CM2
LEFT ATRIUM AREA SYSTOLE SINGLE PLANE A4C: 31.1 CM2
LEFT ATRIUM SIZE: 4 CM
LEFT ATRIUM VOLUME (MOD BIPLANE): 113 ML
LEFT ATRIUM VOLUME INDEX (MOD BIPLANE): 49.8 ML/M2
LEFT INTERNAL DIMENSION IN SYSTOLE: 3.2 CM (ref 2.1–4)
LEFT VENTRICLE DIASTOLIC VOLUME (MOD BIPLANE): 94 ML
LEFT VENTRICLE DIASTOLIC VOLUME INDEX (MOD BIPLANE): 41.4 ML/M2
LEFT VENTRICLE SYSTOLIC VOLUME (MOD BIPLANE): 41 ML
LEFT VENTRICLE SYSTOLIC VOLUME INDEX (MOD BIPLANE): 18.1 ML/M2
LEFT VENTRICULAR INTERNAL DIMENSION IN DIASTOLE: 4.6 CM (ref 3.5–6)
LEFT VENTRICULAR POSTERIOR WALL IN END DIASTOLE: 1.6 CM
LEFT VENTRICULAR STROKE VOLUME: 55 ML
LV EF BIPLANE MOD: 57 %
LV EF US.2D.A4C+ESTIMATED: 61 %
LVSV (TEICH): 55 ML
MV PEAK E VEL: 85 CM/S
MV STENOSIS PRESSURE HALF TIME: 50 MS
MV VALVE AREA P 1/2 METHOD: 4.4
RA PRESSURE ESTIMATED: 3 MMHG
RIGHT ATRIUM AREA SYSTOLE A4C: 22.4 CM2
RIGHT VENTRICLE ID DIMENSION: 5 CM
RV PSP: 11 MMHG
SL CV LEFT ATRIUM LENGTH A2C: 5.6 CM
SL CV LV EF: 56
SL CV PED ECHO LEFT VENTRICLE DIASTOLIC VOLUME (MOD BIPLANE) 2D: 95 ML
SL CV PED ECHO LEFT VENTRICLE SYSTOLIC VOLUME (MOD BIPLANE) 2D: 40 ML
TR MAX PG: 8 MMHG
TR PEAK VELOCITY: 1.4 M/S
TRICUSPID ANNULAR PLANE SYSTOLIC EXCURSION: 1.9 CM
TRICUSPID VALVE PEAK REGURGITATION VELOCITY: 1.44 M/S

## 2025-05-30 PROCEDURE — 93306 TTE W/DOPPLER COMPLETE: CPT

## 2025-07-03 LAB
LEFT EYE DIABETIC RETINOPATHY: NORMAL
RIGHT EYE DIABETIC RETINOPATHY: NORMAL

## 2025-07-24 ENCOUNTER — RA CDI HCC (OUTPATIENT)
Dept: OTHER | Facility: HOSPITAL | Age: 75
End: 2025-07-24

## 2025-07-30 ENCOUNTER — OFFICE VISIT (OUTPATIENT)
Dept: FAMILY MEDICINE CLINIC | Facility: CLINIC | Age: 75
End: 2025-07-30
Payer: COMMERCIAL

## 2025-07-30 VITALS
SYSTOLIC BLOOD PRESSURE: 118 MMHG | HEART RATE: 90 BPM | DIASTOLIC BLOOD PRESSURE: 72 MMHG | WEIGHT: 249.5 LBS | OXYGEN SATURATION: 96 % | HEIGHT: 69 IN | BODY MASS INDEX: 36.95 KG/M2

## 2025-07-30 DIAGNOSIS — E11.65 TYPE 2 DIABETES MELLITUS WITH HYPERGLYCEMIA, WITHOUT LONG-TERM CURRENT USE OF INSULIN (HCC): ICD-10-CM

## 2025-07-30 DIAGNOSIS — I50.32 CHRONIC DIASTOLIC (CONGESTIVE) HEART FAILURE (HCC): Primary | ICD-10-CM

## 2025-07-30 DIAGNOSIS — E66.813 CLASS 3 SEVERE OBESITY DUE TO EXCESS CALORIES WITH SERIOUS COMORBIDITY AND BODY MASS INDEX (BMI) OF 45.0 TO 49.9 IN ADULT: ICD-10-CM

## 2025-07-30 DIAGNOSIS — E78.2 MIXED HYPERLIPIDEMIA: ICD-10-CM

## 2025-07-30 DIAGNOSIS — I10 PRIMARY HYPERTENSION: ICD-10-CM

## 2025-07-30 DIAGNOSIS — I48.11 LONGSTANDING PERSISTENT ATRIAL FIBRILLATION (HCC): ICD-10-CM

## 2025-07-30 PROCEDURE — 99213 OFFICE O/P EST LOW 20 MIN: CPT | Performed by: NURSE PRACTITIONER

## 2025-07-30 PROCEDURE — G2211 COMPLEX E/M VISIT ADD ON: HCPCS | Performed by: NURSE PRACTITIONER

## 2025-07-30 PROCEDURE — 82570 ASSAY OF URINE CREATININE: CPT | Performed by: NURSE PRACTITIONER

## 2025-07-30 PROCEDURE — 82043 UR ALBUMIN QUANTITATIVE: CPT | Performed by: NURSE PRACTITIONER

## 2025-07-30 PROCEDURE — G0439 PPPS, SUBSEQ VISIT: HCPCS | Performed by: NURSE PRACTITIONER

## 2025-07-31 LAB
CREAT UR-MCNC: 27.2 MG/DL
MICROALBUMIN UR-MCNC: 9.4 MG/L
MICROALBUMIN/CREAT 24H UR: 35 MG/G CREATININE (ref 0–30)